# Patient Record
Sex: MALE | Race: BLACK OR AFRICAN AMERICAN | NOT HISPANIC OR LATINO
[De-identification: names, ages, dates, MRNs, and addresses within clinical notes are randomized per-mention and may not be internally consistent; named-entity substitution may affect disease eponyms.]

---

## 2017-04-17 ENCOUNTER — OTHER (OUTPATIENT)
Age: 82
End: 2017-04-17

## 2017-04-18 ENCOUNTER — MESSAGE (OUTPATIENT)
Age: 82
End: 2017-04-18

## 2017-05-22 ENCOUNTER — OTHER (OUTPATIENT)
Age: 82
End: 2017-05-22

## 2017-06-19 ENCOUNTER — OTHER (OUTPATIENT)
Age: 82
End: 2017-06-19

## 2017-06-20 ENCOUNTER — RX RENEWAL (OUTPATIENT)
Age: 82
End: 2017-06-20

## 2017-06-23 ENCOUNTER — EMERGENCY (EMERGENCY)
Facility: HOSPITAL | Age: 82
LOS: 1 days | Discharge: HOME | End: 2017-06-23
Attending: EMERGENCY MEDICINE | Admitting: EMERGENCY MEDICINE
Payer: MEDICARE

## 2017-06-23 VITALS
HEART RATE: 100 BPM | SYSTOLIC BLOOD PRESSURE: 154 MMHG | RESPIRATION RATE: 16 BRPM | OXYGEN SATURATION: 99 % | DIASTOLIC BLOOD PRESSURE: 78 MMHG | TEMPERATURE: 98 F

## 2017-06-23 DIAGNOSIS — Z09 ENCOUNTER FOR FOLLOW-UP EXAMINATION AFTER COMPLETED TREATMENT FOR CONDITIONS OTHER THAN MALIGNANT NEOPLASM: Chronic | ICD-10-CM

## 2017-06-23 PROCEDURE — 99284 EMERGENCY DEPT VISIT MOD MDM: CPT | Mod: GC

## 2017-06-23 NOTE — ED PROVIDER NOTE - CARE PLAN
Principal Discharge DX:	Blurry vision, bilateral  Instructions for follow-up, activity and diet:	FOLLOW UP WITH YOUR Ophthalmologist with WEEK. Follow up with your Primary Medical Doctor within 2-3days. If results or reports were given to you, show copies of your reports given to you. Take all of your medications as previously prescribed. If loss of vision, headache, pain of eyes or any new or concerning symptoms return to the ED. Principal Discharge DX:	Blurry vision, bilateral  Instructions for follow-up, activity and diet:	FOLLOW UP WITH YOUR Ophthalmologist with WEEK. Can follow up at [address] [address], [place], [phone] should there be any issues with your own ophthalmologist. Follow up with your Primary Medical Doctor within 2-3days. If results or reports were given to you, show copies of your reports given to you. Take all of your medications as previously prescribed. If loss of vision, headache, pain of eyes or any new or concerning symptoms return to the ED.

## 2017-06-23 NOTE — ED PROVIDER NOTE - ATTENDING CONTRIBUTION TO CARE
I performed a history and physical exam of the patient and discussed their management with the advanced care provider. I reviewed the advanced care provider's note and agree with the documented findings and plan of care. My medical decision making and objective findings are found above.  Cataracts, 20/200 both eyes. will check occular pressure.

## 2017-06-23 NOTE — ED PROVIDER NOTE - PLAN OF CARE
FOLLOW UP WITH YOUR Ophthalmologist with WEEK. Follow up with your Primary Medical Doctor within 2-3days. If results or reports were given to you, show copies of your reports given to you. Take all of your medications as previously prescribed. If loss of vision, headache, pain of eyes or any new or concerning symptoms return to the ED. FOLLOW UP WITH YOUR Ophthalmologist with WEEK. Can follow up at [address] [address], [place], [phone] should there be any issues with your own ophthalmologist. Follow up with your Primary Medical Doctor within 2-3days. If results or reports were given to you, show copies of your reports given to you. Take all of your medications as previously prescribed. If loss of vision, headache, pain of eyes or any new or concerning symptoms return to the ED.

## 2017-06-23 NOTE — CONSULT NOTE ADULT - SUBJECTIVE AND OBJECTIVE BOX
CC/HPI: 82 M NLP OD, bilateral PK, tube OS, p/w blurred vision OS after getting eye exam at outside ophthalmologist last week. Denies pain.     PMH: DM  POH: PK OU, tube OS, glc  meds: reviewed  all: ornex, percocet CC/HPI: 82 M NLP OD, bilateral PK, tube OS, p/w blurred vision OS after getting eye exam at outside ophthalmologist last week. Denies pain.     PMH: DM  POH: PK OU, tube OS, glc  meds: reviewed  all: ornex, percocet    VA cc near NLP OD, 20/70+ OS  P surgical OU  T 23, 18  EOM full OU    LL flat OU  C/S w+Q OU, tube in place, no exposure  K PK OU, no edema, clear OU, pigment on endothelium  AC D+Q OU, no cell  I surgical OU  L IOL OU    DFE:  OD nerve w/ mild pallor, mac flat  OS 0.7 C/D, mac flat, v/p wnl

## 2017-06-23 NOTE — ED PROVIDER NOTE - OBJECTIVE STATEMENT
81 yo male with PMHx of glaucoma, hld, DM presents to the ED complaining of worsening blurry/foggy/ cloudy vision. Pt also reports 83 yo male with PMHx of glaucoma, hld, DM presents to the ED complaining of worsening blurry/foggy/ cloudy vision. Pt also reports that light causes a glare and makes it hard for him to see. Pt was seen by an ophthalmologist and told he had high pressures in his eye and he needed to follow up with a glaucoma specialist, insurance issues made it hard for him to do that so he was seen in a clinic Wednesday, drops were put in his eyes (unsure of the names) and he symptoms have not improved and still are gradually getting worse. Denies eye pain, headache. 81 yo male with PMHx of glaucoma, hld, DM presents to the ED complaining of worsening blurry/foggy/ cloudy vision. Pt also reports that light causes a glare and makes it hard for him to see. Pt was seen by an ophthalmologist and told he had high pressures in his eye and he needed to follow up with a glaucoma specialist, insurance issues made it hard for him to do that so he was seen in a clinic Wednesday, drops were put in his eyes (unsure of the names) and he symptoms have not improved and still are gradually getting worse. Denies trauma, eye pain, headache, numbness tingling, vision loss. Denies cp, sob, abdominal pain, diarrhea, f/c/n/v, urinary sx.

## 2017-06-23 NOTE — ED ADULT TRIAGE NOTE - CHIEF COMPLAINT QUOTE
pt states that he has been having declining visual acuity since 6/12.  Pt was seen by opthalmology advised that he had "high pressure" in OS, blind in OD, pt states that he can only see outlines

## 2017-06-23 NOTE — ED PROVIDER NOTE - PROGRESS NOTE DETAILS
STEPHANIE Rico: Optho saw patient no acute findings on exam. Pt ok for dc with follow up with his ophthalmologist

## 2017-06-23 NOTE — ED PROVIDER NOTE - MEDICAL DECISION MAKING DETAILS
Tony: slow onset vision changes, being seen by optho, no trauma. Should measure a presure.  Not likely vein or artery occlusion or viteal hemmorrhage. . would refer to optho.

## 2017-06-26 ENCOUNTER — INPATIENT (INPATIENT)
Facility: HOSPITAL | Age: 82
LOS: 8 days | Discharge: NURSING, REHAB OR LONG-TERM CARE | End: 2017-07-05
Attending: HOSPITALIST | Admitting: HOSPITALIST
Payer: MEDICARE

## 2017-06-26 VITALS
DIASTOLIC BLOOD PRESSURE: 76 MMHG | SYSTOLIC BLOOD PRESSURE: 150 MMHG | RESPIRATION RATE: 18 BRPM | HEART RATE: 108 BPM | TEMPERATURE: 98 F | OXYGEN SATURATION: 100 %

## 2017-06-26 DIAGNOSIS — E78.5 HYPERLIPIDEMIA, UNSPECIFIED: ICD-10-CM

## 2017-06-26 DIAGNOSIS — I10 ESSENTIAL (PRIMARY) HYPERTENSION: ICD-10-CM

## 2017-06-26 DIAGNOSIS — E11.3523 TYPE 2 DIABETES MELLITUS WITH PROLIFERATIVE DIABETIC RETINOPATHY WITH TRACTION RETINAL DETACHMENT INVOLVING THE MACULA, BILATERAL: ICD-10-CM

## 2017-06-26 DIAGNOSIS — Z29.9 ENCOUNTER FOR PROPHYLACTIC MEASURES, UNSPECIFIED: ICD-10-CM

## 2017-06-26 DIAGNOSIS — Z09 ENCOUNTER FOR FOLLOW-UP EXAMINATION AFTER COMPLETED TREATMENT FOR CONDITIONS OTHER THAN MALIGNANT NEOPLASM: Chronic | ICD-10-CM

## 2017-06-26 DIAGNOSIS — H53.8 OTHER VISUAL DISTURBANCES: ICD-10-CM

## 2017-06-26 DIAGNOSIS — H40.9 UNSPECIFIED GLAUCOMA: ICD-10-CM

## 2017-06-26 DIAGNOSIS — N40.0 BENIGN PROSTATIC HYPERPLASIA WITHOUT LOWER URINARY TRACT SYMPTOMS: ICD-10-CM

## 2017-06-26 LAB
ALBUMIN SERPL ELPH-MCNC: 4.2 G/DL — SIGNIFICANT CHANGE UP (ref 3.3–5)
ALP SERPL-CCNC: 52 U/L — SIGNIFICANT CHANGE UP (ref 40–120)
ALT FLD-CCNC: 15 U/L — SIGNIFICANT CHANGE UP (ref 4–41)
AST SERPL-CCNC: 18 U/L — SIGNIFICANT CHANGE UP (ref 4–40)
BASOPHILS # BLD AUTO: 0.01 K/UL — SIGNIFICANT CHANGE UP (ref 0–0.2)
BASOPHILS NFR BLD AUTO: 0.2 % — SIGNIFICANT CHANGE UP (ref 0–2)
BILIRUB SERPL-MCNC: 0.2 MG/DL — SIGNIFICANT CHANGE UP (ref 0.2–1.2)
BUN SERPL-MCNC: 15 MG/DL — SIGNIFICANT CHANGE UP (ref 7–23)
CALCIUM SERPL-MCNC: 9.6 MG/DL — SIGNIFICANT CHANGE UP (ref 8.4–10.5)
CHLORIDE SERPL-SCNC: 103 MMOL/L — SIGNIFICANT CHANGE UP (ref 98–107)
CO2 SERPL-SCNC: 23 MMOL/L — SIGNIFICANT CHANGE UP (ref 22–31)
CREAT SERPL-MCNC: 1.06 MG/DL — SIGNIFICANT CHANGE UP (ref 0.5–1.3)
EOSINOPHIL # BLD AUTO: 0.05 K/UL — SIGNIFICANT CHANGE UP (ref 0–0.5)
EOSINOPHIL NFR BLD AUTO: 0.8 % — SIGNIFICANT CHANGE UP (ref 0–6)
GLUCOSE SERPL-MCNC: 149 MG/DL — HIGH (ref 70–99)
HCT VFR BLD CALC: 41.7 % — SIGNIFICANT CHANGE UP (ref 39–50)
HGB BLD-MCNC: 13.9 G/DL — SIGNIFICANT CHANGE UP (ref 13–17)
IMM GRANULOCYTES NFR BLD AUTO: 0.2 % — SIGNIFICANT CHANGE UP (ref 0–1.5)
INR BLD: 1.03 — SIGNIFICANT CHANGE UP (ref 0.88–1.17)
LYMPHOCYTES # BLD AUTO: 1.18 K/UL — SIGNIFICANT CHANGE UP (ref 1–3.3)
LYMPHOCYTES # BLD AUTO: 18.9 % — SIGNIFICANT CHANGE UP (ref 13–44)
MCHC RBC-ENTMCNC: 32.8 PG — SIGNIFICANT CHANGE UP (ref 27–34)
MCHC RBC-ENTMCNC: 33.3 % — SIGNIFICANT CHANGE UP (ref 32–36)
MCV RBC AUTO: 98.3 FL — SIGNIFICANT CHANGE UP (ref 80–100)
MONOCYTES # BLD AUTO: 0.6 K/UL — SIGNIFICANT CHANGE UP (ref 0–0.9)
MONOCYTES NFR BLD AUTO: 9.6 % — SIGNIFICANT CHANGE UP (ref 2–14)
NEUTROPHILS # BLD AUTO: 4.38 K/UL — SIGNIFICANT CHANGE UP (ref 1.8–7.4)
NEUTROPHILS NFR BLD AUTO: 70.3 % — SIGNIFICANT CHANGE UP (ref 43–77)
PLATELET # BLD AUTO: 277 K/UL — SIGNIFICANT CHANGE UP (ref 150–400)
PMV BLD: 9.9 FL — SIGNIFICANT CHANGE UP (ref 7–13)
POTASSIUM SERPL-MCNC: 4.1 MMOL/L — SIGNIFICANT CHANGE UP (ref 3.5–5.3)
POTASSIUM SERPL-SCNC: 4.1 MMOL/L — SIGNIFICANT CHANGE UP (ref 3.5–5.3)
PROT SERPL-MCNC: 8.2 G/DL — SIGNIFICANT CHANGE UP (ref 6–8.3)
PROTHROM AB SERPL-ACNC: 11.6 SEC — SIGNIFICANT CHANGE UP (ref 9.8–13.1)
RBC # BLD: 4.24 M/UL — SIGNIFICANT CHANGE UP (ref 4.2–5.8)
RBC # FLD: 12.4 % — SIGNIFICANT CHANGE UP (ref 10.3–14.5)
SODIUM SERPL-SCNC: 142 MMOL/L — SIGNIFICANT CHANGE UP (ref 135–145)
WBC # BLD: 6.23 K/UL — SIGNIFICANT CHANGE UP (ref 3.8–10.5)
WBC # FLD AUTO: 6.23 K/UL — SIGNIFICANT CHANGE UP (ref 3.8–10.5)

## 2017-06-26 PROCEDURE — 99223 1ST HOSP IP/OBS HIGH 75: CPT

## 2017-06-26 RX ORDER — DEXTROSE 50 % IN WATER 50 %
1 SYRINGE (ML) INTRAVENOUS ONCE
Qty: 0 | Refills: 0 | Status: DISCONTINUED | OUTPATIENT
Start: 2017-06-26 | End: 2017-06-29

## 2017-06-26 RX ORDER — DOXAZOSIN MESYLATE 4 MG
2 TABLET ORAL AT BEDTIME
Qty: 0 | Refills: 0 | Status: DISCONTINUED | OUTPATIENT
Start: 2017-06-26 | End: 2017-07-05

## 2017-06-26 RX ORDER — SODIUM CHLORIDE 9 MG/ML
1000 INJECTION, SOLUTION INTRAVENOUS
Qty: 0 | Refills: 0 | Status: DISCONTINUED | OUTPATIENT
Start: 2017-06-26 | End: 2017-06-28

## 2017-06-26 RX ORDER — LISINOPRIL 2.5 MG/1
10 TABLET ORAL DAILY
Qty: 0 | Refills: 0 | Status: DISCONTINUED | OUTPATIENT
Start: 2017-06-27 | End: 2017-06-28

## 2017-06-26 RX ORDER — INSULIN LISPRO 100/ML
VIAL (ML) SUBCUTANEOUS AT BEDTIME
Qty: 0 | Refills: 0 | Status: DISCONTINUED | OUTPATIENT
Start: 2017-06-26 | End: 2017-07-05

## 2017-06-26 RX ORDER — SIMVASTATIN 20 MG/1
1 TABLET, FILM COATED ORAL
Qty: 0 | Refills: 0 | COMMUNITY

## 2017-06-26 RX ORDER — DEXTROSE 50 % IN WATER 50 %
12.5 SYRINGE (ML) INTRAVENOUS ONCE
Qty: 0 | Refills: 0 | Status: DISCONTINUED | OUTPATIENT
Start: 2017-06-26 | End: 2017-06-29

## 2017-06-26 RX ORDER — GLUCAGON INJECTION, SOLUTION 0.5 MG/.1ML
1 INJECTION, SOLUTION SUBCUTANEOUS ONCE
Qty: 0 | Refills: 0 | Status: DISCONTINUED | OUTPATIENT
Start: 2017-06-26 | End: 2017-06-29

## 2017-06-26 RX ORDER — SIMVASTATIN 20 MG/1
20 TABLET, FILM COATED ORAL AT BEDTIME
Qty: 0 | Refills: 0 | Status: DISCONTINUED | OUTPATIENT
Start: 2017-06-26 | End: 2017-07-05

## 2017-06-26 RX ORDER — DEXTROSE 50 % IN WATER 50 %
25 SYRINGE (ML) INTRAVENOUS ONCE
Qty: 0 | Refills: 0 | Status: DISCONTINUED | OUTPATIENT
Start: 2017-06-26 | End: 2017-07-05

## 2017-06-26 RX ORDER — DEXTROSE 50 % IN WATER 50 %
25 SYRINGE (ML) INTRAVENOUS ONCE
Qty: 0 | Refills: 0 | Status: DISCONTINUED | OUTPATIENT
Start: 2017-06-26 | End: 2017-06-29

## 2017-06-26 RX ORDER — INSULIN LISPRO 100/ML
VIAL (ML) SUBCUTANEOUS
Qty: 0 | Refills: 0 | Status: DISCONTINUED | OUTPATIENT
Start: 2017-06-26 | End: 2017-07-05

## 2017-06-26 RX ORDER — ENOXAPARIN SODIUM 100 MG/ML
40 INJECTION SUBCUTANEOUS EVERY 24 HOURS
Qty: 0 | Refills: 0 | Status: DISCONTINUED | OUTPATIENT
Start: 2017-06-26 | End: 2017-06-27

## 2017-06-26 RX ADMIN — SIMVASTATIN 20 MILLIGRAM(S): 20 TABLET, FILM COATED ORAL at 22:35

## 2017-06-26 RX ADMIN — ENOXAPARIN SODIUM 40 MILLIGRAM(S): 100 INJECTION SUBCUTANEOUS at 22:36

## 2017-06-26 RX ADMIN — Medication 2 MILLIGRAM(S): at 23:14

## 2017-06-26 NOTE — ED ADULT NURSE NOTE - OBJECTIVE STATEMENT
Received in intake spot 13. Alert and oriented x3, ambulatory. Co blurry vision x 2 weeks, progressively getting worse. Hx of glaucoma. Told by opthomologist to go to ED for eval. Labs sent. IV placed.

## 2017-06-26 NOTE — H&P ADULT - PROBLEM SELECTOR PROBLEM 2
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

## 2017-06-26 NOTE — ED PROVIDER NOTE - PROGRESS NOTE DETAILS
Derick :  Case discussed w opthalmology attending Dr. Mccarthy and pt will be seen while he is in the hospital.  We are admitting him to the hospital because he cannot see.

## 2017-06-26 NOTE — PATIENT PROFILE ADULT. - ABILITY TO HEAR (WITH HEARING AID OR HEARING APPLIANCE IF NORMALLY USED):
Mildly to Moderately Impaired: difficulty hearing in some environments or speaker may need to increase volume or speak distinctly/right ear [place]

## 2017-06-26 NOTE — H&P ADULT - HISTORY OF PRESENT ILLNESS
82y M with PMHX of Glaucoma, DM2, BPH p/w worsening blurry vision. Patient endorses progressive worsening of his vision the last few months. Patient normally follows up with Opthalmology at [place] on [address]. Patient was last seen by opthalmology on June 12th where he was told his IOP was 18 and he was referred to a glaucoma specialist. However, due to his worsening vision he felt unsafe traveling. Patient presented to [facility] ED on june 23rd with similar complaint. Was seen by Opthalmology at that time and recommend f/u with outpt Opthomalogist or f/u at [address] [place] [address] [phone] if he chooses too. Patient decided to return because his vision did not improve and he felt unsafe traveling. Patient currently denies CP, SOB, fever, chills, nausea, vomiting, denies weakness, dizziness, or eye pain. States that he used to be able to read his pill bottles but now everything is blurry.    VS in the ED: T: 36.7, HR: 108, BP: 150/76, RR: 18, 100% on RA    Labs personally reviewed: CBC WNL, CMP WNL.  No EKG was performed 82y M with PMHX of Glaucoma, DM2, BPH, HLD p/w worsening blurry vision. Patient endorses progressive worsening of his vision the last few months. Patient normally follows up with Opthalmology at [place] on [address]. Patient was last seen by opthalmology on June 12th where he was told his IOP was 18 and he was referred to a glaucoma specialist. However, due to his worsening vision he felt unsafe traveling. Patient presented to [facility] ED on june 23rd with similar complaint. Was seen by Opthalmology at that time and recommend f/u with outpt Opthomalogist or f/u at [address] [place] [address] [phone] if he chooses too. Patient decided to return because his vision did not improve and he felt unsafe traveling. Patient currently denies CP, SOB, fever, chills, nausea, vomiting, denies weakness, dizziness, or eye pain. States that he used to be able to read his pill bottles but now everything is blurry.    VS in the ED: T: 36.7, HR: 108, BP: 150/76, RR: 18, 100% on RA    Labs personally reviewed: CBC WNL, CMP WNL.  No EKG was performed

## 2017-06-26 NOTE — ED ADULT NURSE NOTE - CHIEF COMPLAINT QUOTE
Pt c/o blurry vision x2 weeks--getting progressively worse. Pt was seen in ER on friday and eyes are very bad--pt was called today to come back to ER right away--ophthomologist called pt to come back for futher evaluation

## 2017-06-26 NOTE — H&P ADULT - PROBLEM SELECTOR PLAN 2
Patient on oral hypoglycemics at home. Previous A1c was from 2016 and was 9.8  - Check A1c in AM  - Start ISS, FSG qHS & qAC

## 2017-06-26 NOTE — ED PROVIDER NOTE - OBJECTIVE STATEMENT
81 yo male with PMHx of glaucoma, hld, DM presents to the ED complaining of worsening blurry/foggy/ cloudy vision..  Pt was seen in the ED Friday 6/23/17 for evaluation of glucoma, pt has elevated introcular pressures which were going down.  Pt lives alone and was called back to the ED today because he says the vision in both eyes is now worse. 81 yo male with PMHx of glaucoma, hld, DM presents to the ED complaining of worsening blurry/foggy/ cloudy vision..  Pt was seen in the ED Friday 6/23/17 for evaluation of glucoma, pt has elevated introcular pressures which were going down.  Pt lives alone and was called back to the ED today because he says the vision in both eyes is now worse.  Pt says that his vision is now worse than ever.  He was able to see clearly two weeks ago.

## 2017-06-26 NOTE — H&P ADULT - PROBLEM SELECTOR PLAN 1
-Patient's sx of visual blurriness appear 2/2 to progressive worsening of his Glaucoma.  - Patient is being followed by opthomalogy who last time recommended for him to f/u with his outpatient opthomalgoist however due to the patients worsening vision it would be unsafe to discharge patient as he lives alone.  - F/u Opthalmology recs but will likely start latanoprost -Patient's sx of visual blurriness appear 2/2 to progressive worsening of his Glaucoma.  - Patient is being followed by opthomalogy who last time recommended for him to f/u with his outpatient ophthalmologist however due to the patients worsening vision it would be unsafe to discharge patient as he lives alone.  - F/u Opthalmology recs but will likely start latanoprost  - F/u  -Patient's sx of visual blurriness appear 2/2 to progressive worsening of his Glaucoma.  - Patient is being followed by opthalmology who last time recommended for him to f/u with his outpatient ophthalmologist however due to the patients worsening vision it would be unsafe to discharge patient as he lives alone.  - F/u Opthalmology recs but will likely start latanoprost  - F/u  -Patient's sx of visual blurriness appear 2/2 to progressive worsening of his Glaucoma.  - Patient is being followed by opthalmology who last time recommended for him to f/u with his outpatient ophthalmologist however due to the patients worsening vision it would be unsafe to discharge patient as he lives alone.  - F/u Opthalmology recs but will likely start latanoprost; OBTAIN SPECIFIC RECCS TO INITIATE GLAUCOMA TREATMENT FROM OPTHALMOLOGY WHILE INPATIENT  - F/u

## 2017-06-26 NOTE — H&P ADULT - NSHPPHYSICALEXAM_GEN_ALL_CORE
General: WN/WD NAD  HEENT:  Markedly diminished visual acuity B/L. Left eye worse than Right eye. PERRLA, EOMI, moist mucous membranes  Neurology: A&Ox3, nonfocal, SALCIDO x 4  Respiratory: CTA B/L, normal respiratory effort, no wheezes, crackles, rales  CV: RRR, S1S2, no murmurs, rubs or gallops  Abdominal: Soft, NT, ND +BS, Last BM  Extremities: No edema, + peripheral pulses General: WN/WD NAD  HEENT:  Markedly diminished visual acuity B/L. Left eye worse than Right eye. PERRLA, EOMI, moist mucous membranes  Neurology: A&Ox3, nonfocal, SALCIDO x 4  Respiratory: CTA B/L, normal respiratory effort, no wheezes, crackles, rales  CV: RRR, S1S2, no murmurs, rubs or gallops  Abdominal: Soft, NT, ND +BS, Last BM  Extremities: No edema, + peripheral pulses  Skin: no petechia, rashes seen

## 2017-06-26 NOTE — ED ADULT TRIAGE NOTE - CHIEF COMPLAINT QUOTE
Pt c/o blurry vision x2 weeks--getting progressively worse. Pt was seen in ER on friday and eyes are very bad--pt was called today to come back to ER right away Pt c/o blurry vision x2 weeks--getting progressively worse. Pt was seen in ER on friday and eyes are very bad--pt was called today to come back to ER right away--ophthomologist called pt to come back for futher evaluation

## 2017-06-26 NOTE — H&P ADULT - NSHPLABSRESULTS_GEN_ALL_CORE
Labs personally reviewed: CBC WNL, CMP WNL.  No EKG was performed Labs personally reviewed: CBC WNL, CMP WNL.  No EKG was performed. Will check one

## 2017-06-27 LAB — HBA1C BLD-MCNC: 9.2 % — HIGH (ref 4–5.6)

## 2017-06-27 PROCEDURE — 12345: CPT | Mod: GC,NC

## 2017-06-27 PROCEDURE — 99222 1ST HOSP IP/OBS MODERATE 55: CPT

## 2017-06-27 RX ORDER — TIMOLOL 0.5 %
1 DROPS OPHTHALMIC (EYE)
Qty: 0 | Refills: 0 | Status: DISCONTINUED | OUTPATIENT
Start: 2017-06-27 | End: 2017-07-05

## 2017-06-27 RX ORDER — BRIMONIDINE TARTRATE 2 MG/MG
1 SOLUTION/ DROPS OPHTHALMIC
Qty: 0 | Refills: 0 | Status: DISCONTINUED | OUTPATIENT
Start: 2017-06-27 | End: 2017-07-05

## 2017-06-27 RX ORDER — LATANOPROST 0.05 MG/ML
1 SOLUTION/ DROPS OPHTHALMIC; TOPICAL AT BEDTIME
Qty: 0 | Refills: 0 | Status: DISCONTINUED | OUTPATIENT
Start: 2017-06-27 | End: 2017-07-05

## 2017-06-27 RX ADMIN — Medication 2 MILLIGRAM(S): at 22:16

## 2017-06-27 RX ADMIN — Medication 3: at 12:42

## 2017-06-27 RX ADMIN — SIMVASTATIN 20 MILLIGRAM(S): 20 TABLET, FILM COATED ORAL at 22:16

## 2017-06-27 RX ADMIN — LISINOPRIL 10 MILLIGRAM(S): 2.5 TABLET ORAL at 06:35

## 2017-06-27 RX ADMIN — LATANOPROST 1 DROP(S): 0.05 SOLUTION/ DROPS OPHTHALMIC; TOPICAL at 22:16

## 2017-06-27 RX ADMIN — BRIMONIDINE TARTRATE 1 DROP(S): 2 SOLUTION/ DROPS OPHTHALMIC at 17:58

## 2017-06-27 RX ADMIN — Medication 2: at 17:57

## 2017-06-27 RX ADMIN — Medication 1 DROP(S): at 17:59

## 2017-06-27 NOTE — PROGRESS NOTE ADULT - PROBLEM SELECTOR PLAN 2
Patient on oral hypoglycemics at home. Previous A1c was from 2016 and was 9.8  -Continue Metformin and Jenuvia

## 2017-06-27 NOTE — CONSULT NOTE ADULT - SUBJECTIVE AND OBJECTIVE BOX
CC/HPI: 82 M hx of adv glc, NLP OD p/w decreased vision for two weeks. Pt has not been on any drops for over a year. Did not see an ophthalmologist for a year until going to [facility] in early June. He was advised to f/u w the glaucoma specialist, but was not able to go due to insurance issues. Pt states ever since his visit to the ophthalmologist in early June his vision has decreased.     Spoke with [facility] resident. Pt was 20/200 PH 20/80 OS at last visit in early June. 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 IOP was 14 OS. Pt was not on any drops.     PMH: DM, BPH, HLD  POH: adv glc, NLP OD, bilateral PK, tube OS  meds: reviewed, not on any glc drops  all: reviewed    VA cc near  NLP OD, 20/200 OS PH 20/70-  P surgical OU  T 23, 23  EOM full OU  CVF unable    LL flat OU  C/S w+Q OU, tube in place, no exposure  K PK OU, no edema, clear OU, pigment on endothelium  AC D+F OU, no cell  I surgical OU  L IOL OU CC/HPI: 82 M hx of adv glc, NLP OD p/w decreased vision for two weeks. Pt has not been on any drops for over a year. Did not see an ophthalmologist for a year until going to [facility] in early June. He was advised to f/u w the glaucoma specialist, but was not able to go due to insurance issues. Pt states ever since his visit to the ophthalmologist in early June his vision has decreased.     Spoke with [facility] resident. Pt was 20/200 PH 20/80 OS at last visit in early June. 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 IOP was 14 OS. Pt was not on any drops.     PMH: DM, BPH, HLD  POH: adv glc, NLP OD, bilateral PK, tube OS  meds: reviewed, not on any glc drops  all: reviewed    VA cc near  NLP OD, 20/200 OS PH 20/70-  P surgical OU  T 23, 23  EOM full OU  CVF unable    LL flat OU  C/S w+Q OU, tube in place, no exposure  K PK OU, no edema, pigment on endothelium OS  AC D+F OU, no cell  I surgical OU  L IOL OU    DFE:  OD glaucomatous nerve, m/v/p wnl  OS glaucomatous nerve, m/v/p wnl CC/HPI: 82 M hx of adv glc, NLP OD p/w decreased vision for two weeks OS. Pt has not been on any drops for over a year. Did not see an ophthalmologist for a year until going to [facility] in early June. He was advised to f/u w the glaucoma specialist, but was not able to go due to insurance issues. Pt states ever since his visit to the ophthalmologist in early June his vision has decreased.     Spoke with [facility] resident. Pt was 20/200 PH 20/80 OS at last visit in early June. 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 IOP was 14 OS. Pt was not on any drops.     PMH: DM, BPH, HLD  POH: adv glc, NLP OD, bilateral PK, tube OS  meds: reviewed, not on any glc drops  all: reviewed    VA cc near  NLP OD, 20/200 OS PH 20/70-  P surgical OU  T 23, 23  EOM full OU  CVF limited cooperation    SLE  LLA flat OU  C/S w+Q OU, tube in place OU, no exposure  K PK intact, mild haziness OU, minimal chronic appearing edema OU, old appearing pigment on endothelium OS  AC D+F OU, no cell, QUIET OU  I surgical OU  L IOL OU, PCO     DFE:  OD >.9, pale nerve, m/v/p wnl  OS hazy view, ~.7 s/p , m/v/p wnl. mac flat CC/HPI: 82 M hx of adv glc, NLP OD p/w decreased vision for two weeks OS. Pt has not been on any drops for over a year. Did not see an ophthalmologist for a year until going to [facility] in early June. He was advised to f/u w the glaucoma specialist, but was not able to go due to insurance issues. Pt states ever since his visit to the ophthalmologist in early June his vision has decreased.     Spoke with [facility] resident. Pt was 20/200 PH 20/80 OS at last visit in early June. 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 IOP was 14 OS. Pt was not on any drops.     PMH: DM, BPH, HLD  POH: adv glc, NLP OD, bilateral PK, tube OS  meds: reviewed, not on any glc drops  all: reviewed    Social: neg IVDA  ROS: neg x 10. denies change in strength/sensation/jaw claudication/HA/scalp tenderness  Fhx: neg blindness    VA cc near  NLP OD, 20/200 OS PH 20/70-  P surgical OU  T 23, 23  EOM full OU  CVF limited cooperation    SLE  LLA flat OU  C/S w+Q OU, tube in place OU, no exposure  K PK intact, mild haziness OU, minimal chronic appearing edema OU, old appearing pigment on endothelium OS  AC D+F OU, no cell, QUIET OU  I surgical OU  L IOL OU, PCO     DFE: hazy view OU  OD >.9, pale nerve, m/v/p wnl  OS hazy view, ~.7 s/p , m/v/p wnl. mac flat   no heme/rd seen OU

## 2017-06-27 NOTE — CONSULT NOTE ADULT - ASSESSMENT
82 M extensive ocular history w/ blurred vision. No acute pathology on exam. Pt endorsed improvement in vision midway through exam. Stated he was able to read his pill bottles as he was able to in the past.   - reassurance provided  - recommend f/u w/ own ophthalmologist next week  - can f/u at [address] [place] [address], [place], [phone] should there be any issues with his own ophthalmologist.
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 Pt endorses decreased vision, but visual acuity is similar to last vision recorded at [facility] in early June.   - start latanoprost qHS OU, combigan BID OU  - further recs to follow

## 2017-06-27 NOTE — PROGRESS NOTE ADULT - PROBLEM SELECTOR PLAN 1
-Patient's sx of visual blurriness appear 2/2 to progressive worsening of his Glaucoma.  - Patient is being followed by opthalmology who last time recommended for him to f/u with his outpatient ophthalmologist however due to the patients worsening vision it would be unsafe to discharge patient as he lives alone.  - Will follow up with Optho on specific recommendations.   - F/u

## 2017-06-27 NOTE — CONSULT NOTE ADULT - ASSESSMENT
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 Pt endorses decreased vision, but visual acuity is similar to last vision recorded at [facility] in early June.   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 Pt endorses decreased vision, but visual acuity is similar to last vision recorded at [facility] in early June.   - start latanoprost qHS OU, combigan BID OU  - f/u w/ [facility] on discharge or at [address] [facility]. [facility] [address], [place], [phone]. 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 Pt endorses decreased vision, but visual acuity is similar to last vision recorded at [facility] in early June.   - start latanoprost qHS OS, combigan BID OS  - f/u w/ [facility] on discharge or at [address] [facility]. [facility] [address], [place], [phone].

## 2017-06-27 NOTE — CONSULT NOTE ADULT - ATTENDING COMMENTS
pt seen and examined with resident. agree with above  subacute visual loss OS in monocular patient. Recently seen for similar complaints at primary eye MD at [facility]. vision stable since then.  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  - preservative free artifical tears QID OU  needs outpatient f/up with [facility] within 1 week of discharge or can see our eye clinic at [address] [place] [address], [phone].  cotn current care per primary team pt seen and examined with resident. agree with above  subacute visual loss OS in monocular patient. Recently seen for similar complaints at primary eye MD at [facility]. vision stable since then.  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  - preservative free artifical tears QID OU  needs outpatient f/up for further evaluation with [facility] within 1 week of discharge or can see our eye clinic at [address] [place] [address], [phone].  cont current care per primary team, neuro-imaging if concerned for CVA

## 2017-06-27 NOTE — CONSULT NOTE ADULT - SUBJECTIVE AND OBJECTIVE BOX
CC/HPI: 82 M hx of adv glc, NLP OD p/w decreased vision for two weeks. Pt has not been on any drops for over a year. Did not see an ophthalmologist for a year until going to [facility] in early June. He was advised to f/u w the glaucoma specialist, but was not able to go due to insurance issues. Pt states ever since his visit to the ophthalmologist in early June his vision has decreased.     PMH: CC/HPI: 82 M hx of adv glc, NLP OD p/w decreased vision for two weeks. Pt has not been on any drops for over a year. Did not see an ophthalmologist for a year until going to [facility] in early June. He was advised to f/u w the glaucoma specialist, but was not able to go due to insurance issues. Pt states ever since his visit to the ophthalmologist in early June his vision has decreased.     Spoke with [facility] resident. Pt was 20/200 PH 20/80 OS at last visit in early June. 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 IOP was 14 OS. Pt was not on any drops.     PMH: DM, BPH, HLD  POH: adv glc, NLP OD, bilateral PK, tube OS  meds: reviewed, not on any glc drops  all: reviewed    VA NLP OD, CF OS  P surgical OU  T 23, 23  EOM full OU  CVF unable    LL flat OU  C/S w+Q OU, tube in place, no exposure  K PK OU, no edema, clear OU, pigment on endothelium  AC D+F OU, no cell  I surgical OU  L IOL OU CC/HPI: 82 M hx of adv glc, NLP OD p/w decreased vision for two weeks. Pt has not been on any drops for over a year. Did not see an ophthalmologist for a year until going to [facility] in early June. He was advised to f/u w the glaucoma specialist, but was not able to go due to insurance issues. Pt states ever since his visit to the ophthalmologist in early June his vision has decreased.     Spoke with [facility] resident. Pt was 20/200 PH 20/80 OS at last visit in early June. 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 IOP was 14 OS. Pt was not on any drops.     PMH: DM, BPH, HLD  POH: adv glc, NLP OD, bilateral PK, tube OS  meds: reviewed, not on any glc drops  all: reviewed    VA cc near  NLP OD, 20/200 OS PH 20/70-  P surgical OU  T 23, 23  EOM full OU  CVF unable    LL flat OU  C/S w+Q OU, tube in place, no exposure  K PK OU, no edema, clear OU, pigment on endothelium  AC D+F OU, no cell  I surgical OU  L IOL OU

## 2017-06-27 NOTE — PROGRESS NOTE ADULT - SUBJECTIVE AND OBJECTIVE BOX
Patient is a 82y old  Male who presents with a chief complaint of Blurry vision (26 Jun 2017 16:54)        SUBJECTIVE / OVERNIGHT EVENTS: Patient endorses continued blurry vision. Left eye feels "sore". Denies headahcem nausea, vomiting or any other issues.       MEDICATIONS  (STANDING):  insulin lispro (HumaLOG) corrective regimen sliding scale   SubCutaneous three times a day before meals  insulin lispro (HumaLOG) corrective regimen sliding scale   SubCutaneous at bedtime  dextrose 5%. 1000 milliLiter(s) (50 mL/Hr) IV Continuous <Continuous>  dextrose 50% Injectable 12.5 Gram(s) IV Push once  dextrose 50% Injectable 25 Gram(s) IV Push once  dextrose 50% Injectable 25 Gram(s) IV Push once  simvastatin 20 milliGRAM(s) Oral at bedtime  doxazosin 2 milliGRAM(s) Oral at bedtime  lisinopril 10 milliGRAM(s) Oral daily    MEDICATIONS  (PRN):  dextrose Gel 1 Dose(s) Oral once PRN Blood Glucose LESS THAN 70 milliGRAM(s)/deciLiter  glucagon  Injectable 1 milliGRAM(s) IntraMuscular once PRN Glucose <70 milliGRAM(s)/deciLiter      Vital Signs Last 24 Hrs  T(C): 36.7 (06-27-17 @ 06:31), Max: 36.7 (06-26-17 @ 13:16)  HR: 69 (06-27-17 @ 06:31) (67 - 108)  BP: 122/68 (06-27-17 @ 06:31) (122/68 - 151/86)  RR: 17 (06-27-17 @ 06:31) (16 - 18)  SpO2: 100% (06-27-17 @ 06:31) (100% - 100%)  CAPILLARY BLOOD GLUCOSE  133 (27 Jun 2017 08:58)  215 (26 Jun 2017 22:17)        I&O's Summary    Physical Exam:  General: WN/WD NAD HEENT:  Markedly diminished visual acuity B/L. Left eye worse than Right eye. PERRLA, EOMI, moist mucous membranes Neurology: A&Ox3, nonfocal, SALCIDO x 4 Respiratory: CTA B/L, normal respiratory effort, no wheezes, crackles, rales CV: RRR, S1S2, no murmurs, rubs or gallops Abdominal: Soft, NT, ND +BS, Last BM Extremities: No edema, + peripheral pulses Skin: no petechia, rashes seen	      LABS:                        13.9   6.23  )-----------( 277      ( 26 Jun 2017 15:10 )             41.7     06-26    142  |  103  |  15  ----------------------------<  149<H>  4.1   |  23  |  1.06    Ca    9.6      26 Jun 2017 15:10    TPro  8.2  /  Alb  4.2  /  TBili  0.2  /  DBili  x   /  AST  18  /  ALT  15  /  AlkPhos  52  06-26    PT/INR - ( 26 Jun 2017 15:10 )   PT: 11.6 SEC;   INR: 1.03

## 2017-06-28 PROCEDURE — 99232 SBSQ HOSP IP/OBS MODERATE 35: CPT | Mod: GC

## 2017-06-28 RX ADMIN — LATANOPROST 1 DROP(S): 0.05 SOLUTION/ DROPS OPHTHALMIC; TOPICAL at 22:44

## 2017-06-28 RX ADMIN — Medication 1: at 09:42

## 2017-06-28 RX ADMIN — Medication 1 DROP(S): at 17:45

## 2017-06-28 RX ADMIN — SIMVASTATIN 20 MILLIGRAM(S): 20 TABLET, FILM COATED ORAL at 22:44

## 2017-06-28 RX ADMIN — Medication 1 DROP(S): at 05:54

## 2017-06-28 RX ADMIN — BRIMONIDINE TARTRATE 1 DROP(S): 2 SOLUTION/ DROPS OPHTHALMIC at 17:45

## 2017-06-28 RX ADMIN — Medication 2: at 17:46

## 2017-06-28 RX ADMIN — Medication 2: at 13:29

## 2017-06-28 RX ADMIN — Medication 1 DROP(S): at 07:02

## 2017-06-28 RX ADMIN — Medication 1 DROP(S): at 01:37

## 2017-06-28 RX ADMIN — BRIMONIDINE TARTRATE 1 DROP(S): 2 SOLUTION/ DROPS OPHTHALMIC at 05:54

## 2017-06-28 RX ADMIN — Medication 2 MILLIGRAM(S): at 22:44

## 2017-06-28 NOTE — PROGRESS NOTE ADULT - SUBJECTIVE AND OBJECTIVE BOX
Patient is a 82y old  Male who presents with a chief complaint of Blurry vision (26 Jun 2017 16:54)        SUBJECTIVE / OVERNIGHT EVENTS: Patient had no acute events overnight. This morning, patient complains that he still can not see. He feels as though his eye sight is worse than yesterday despite receiving the eye drops.      MEDICATIONS  (STANDING):  insulin lispro (HumaLOG) corrective regimen sliding scale   SubCutaneous three times a day before meals  insulin lispro (HumaLOG) corrective regimen sliding scale   SubCutaneous at bedtime  dextrose 50% Injectable 12.5 Gram(s) IV Push once  dextrose 50% Injectable 25 Gram(s) IV Push once  dextrose 50% Injectable 25 Gram(s) IV Push once  simvastatin 20 milliGRAM(s) Oral at bedtime  doxazosin 2 milliGRAM(s) Oral at bedtime  latanoprost 0.005% Ophthalmic Solution 1 Drop(s) Both EYES at bedtime  brimonidine 0.2% Ophthalmic Solution 1 Drop(s) Both EYES two times a day  timolol 0.5% Solution 1 Drop(s) Both EYES two times a day  artificial tears (preservative free) Ophthalmic Solution 1 Drop(s) Both EYES four times a day    MEDICATIONS  (PRN):  dextrose Gel 1 Dose(s) Oral once PRN Blood Glucose LESS THAN 70 milliGRAM(s)/deciLiter  glucagon  Injectable 1 milliGRAM(s) IntraMuscular once PRN Glucose <70 milliGRAM(s)/deciLiter      Vital Signs Last 24 Hrs  T(C): 36.6 (06-28-17 @ 05:38), Max: 36.8 (06-27-17 @ 15:31)  HR: 56 (06-28-17 @ 10:30) (53 - 86)  BP: 119/56 (06-28-17 @ 10:30) (112/64 - 129/61)  RR: 17 (06-28-17 @ 10:30) (16 - 18)  SpO2: 99% (06-28-17 @ 10:30) (98% - 99%)  CAPILLARY BLOOD GLUCOSE  225 (28 Jun 2017 12:40)  186 (28 Jun 2017 08:31)  197 (27 Jun 2017 22:33)  236 (27 Jun 2017 19:14)  238 (27 Jun 2017 17:31)        I&O's Summary    27 Jun 2017 07:01  -  28 Jun 2017 07:00  --------------------------------------------------------  IN: 0 mL / OUT: 250 mL / NET: -250 mL      Physical Exam:  General: WN/WD NAD HEENT:  Markedly diminished visual acuity B/L. Left eye worse than Right eye. PERRLA, EOMI, moist mucous membranes Neurology: A&Ox3, nonfocal, SALCIDO x 4 Respiratory: CTA B/L, normal respiratory effort, no wheezes, crackles, rales CV: RRR, S1S2, no murmurs, rubs or gallops Abdominal: Soft, NT, ND +BS, Last BM Extremities: No edema, + peripheral pulses Skin: no petechia, rashes seen	      LABS:                        13.9   6.23  )-----------( 277      ( 26 Jun 2017 15:10 )             41.7     06-26    142  |  103  |  15  ----------------------------<  149<H>  4.1   |  23  |  1.06    Ca    9.6      26 Jun 2017 15:10    TPro  8.2  /  Alb  4.2  /  TBili  0.2  /  DBili  x   /  AST  18  /  ALT  15  /  AlkPhos  52  06-26    PT/INR - ( 26 Jun 2017 15:10 )   PT: 11.6 SEC;   INR: 1.03            Consultant(s) Notes Reviewed:  OPTHO  Care Discussed with Consultants/Other Providers:OPTJO

## 2017-06-28 NOTE — PROGRESS NOTE ADULT - PROBLEM SELECTOR PLAN 6
none, as he is ambulatory none, as he is ambulatory    Dispo:  -PT consult ordered, will follow recommendations   - F/u  regarding home services

## 2017-06-28 NOTE — PROGRESS NOTE ADULT - PROBLEM SELECTOR PLAN 1
-Patient's sx of visual blurriness appear 2/2 to progressive worsening of his Glaucoma.  - Patient is being followed by opthalmology who last time recommended for him to f/u with his outpatient ophthalmologist however due to the patients worsening vision it would be unsafe to discharge patient as he lives alone.  - Will follow up with Optho on specific recommendations.   - F/u  -Patient's sx of visual blurriness appear 2/2 to progressive worsening of his Glaucoma.  -Will follow Optho recommendations: Latanoprost qHS, Combigan BID and artifical tears

## 2017-06-28 NOTE — PROGRESS NOTE ADULT - PROBLEM SELECTOR PLAN 3
BP stable, C/w Lisinopril 10mg BP stable, will hold Lisinopril 10mg as patient is complaining of low blood pressure

## 2017-06-29 PROCEDURE — 99232 SBSQ HOSP IP/OBS MODERATE 35: CPT | Mod: GC

## 2017-06-29 RX ORDER — INSULIN GLARGINE 100 [IU]/ML
5 INJECTION, SOLUTION SUBCUTANEOUS AT BEDTIME
Qty: 0 | Refills: 0 | Status: DISCONTINUED | OUTPATIENT
Start: 2017-06-29 | End: 2017-06-30

## 2017-06-29 RX ORDER — ENOXAPARIN SODIUM 100 MG/ML
40 INJECTION SUBCUTANEOUS EVERY 24 HOURS
Qty: 0 | Refills: 0 | Status: DISCONTINUED | OUTPATIENT
Start: 2017-06-29 | End: 2017-07-05

## 2017-06-29 RX ORDER — CALCIUM CARBONATE 500(1250)
1 TABLET ORAL ONCE
Qty: 0 | Refills: 0 | Status: COMPLETED | OUTPATIENT
Start: 2017-06-29 | End: 2017-06-29

## 2017-06-29 RX ADMIN — ENOXAPARIN SODIUM 40 MILLIGRAM(S): 100 INJECTION SUBCUTANEOUS at 12:22

## 2017-06-29 RX ADMIN — Medication 1: at 00:03

## 2017-06-29 RX ADMIN — BRIMONIDINE TARTRATE 1 DROP(S): 2 SOLUTION/ DROPS OPHTHALMIC at 06:37

## 2017-06-29 RX ADMIN — SIMVASTATIN 20 MILLIGRAM(S): 20 TABLET, FILM COATED ORAL at 22:31

## 2017-06-29 RX ADMIN — Medication 1 DROP(S): at 23:55

## 2017-06-29 RX ADMIN — Medication 1: at 22:30

## 2017-06-29 RX ADMIN — Medication 1 DROP(S): at 17:09

## 2017-06-29 RX ADMIN — Medication 1 DROP(S): at 12:22

## 2017-06-29 RX ADMIN — Medication 2 MILLIGRAM(S): at 22:31

## 2017-06-29 RX ADMIN — INSULIN GLARGINE 5 UNIT(S): 100 INJECTION, SOLUTION SUBCUTANEOUS at 22:31

## 2017-06-29 RX ADMIN — LATANOPROST 1 DROP(S): 0.05 SOLUTION/ DROPS OPHTHALMIC; TOPICAL at 22:31

## 2017-06-29 RX ADMIN — Medication 2: at 18:00

## 2017-06-29 RX ADMIN — Medication 2: at 12:27

## 2017-06-29 RX ADMIN — Medication 1 DROP(S): at 06:37

## 2017-06-29 RX ADMIN — Medication 1 TABLET(S): at 23:53

## 2017-06-29 RX ADMIN — BRIMONIDINE TARTRATE 1 DROP(S): 2 SOLUTION/ DROPS OPHTHALMIC at 17:09

## 2017-06-29 RX ADMIN — Medication 1: at 09:34

## 2017-06-29 NOTE — PROGRESS NOTE ADULT - PROBLEM SELECTOR PLAN 1
-Patient's sx of visual blurriness appear 2/2 to progressive worsening of his Glaucoma.  -Will follow Optho recommendations: Latanoprost qHS, Combigan BID and artifical tears

## 2017-06-29 NOTE — PROGRESS NOTE ADULT - PROBLEM SELECTOR PLAN 6
none, as he is ambulatory    Dispo:  -PT consult ordered, will follow recommendations   -F/u  regarding home services Lovenox    Dispo:  -PT consult ordered, will follow recommendations   -F/u  regarding home services

## 2017-06-29 NOTE — PROGRESS NOTE ADULT - SUBJECTIVE AND OBJECTIVE BOX
Patient is a 82y old  Male who presents with a chief complaint of Blurry vision (26 Jun 2017 16:54)        SUBJECTIVE / OVERNIGHT EVENTS: Patient had no acute events overnight. Patient says that he was able to see a bit better last night after receiving his eye drops, but this morning he continues to complain of blurry vision.       MEDICATIONS  (STANDING):  insulin lispro (HumaLOG) corrective regimen sliding scale   SubCutaneous three times a day before meals  insulin lispro (HumaLOG) corrective regimen sliding scale   SubCutaneous at bedtime  dextrose 50% Injectable 25 Gram(s) IV Push once  simvastatin 20 milliGRAM(s) Oral at bedtime  doxazosin 2 milliGRAM(s) Oral at bedtime  latanoprost 0.005% Ophthalmic Solution 1 Drop(s) Both EYES at bedtime  brimonidine 0.2% Ophthalmic Solution 1 Drop(s) Both EYES two times a day  timolol 0.5% Solution 1 Drop(s) Both EYES two times a day  artificial tears (preservative free) Ophthalmic Solution 1 Drop(s) Both EYES four times a day  insulin glargine Injectable (LANTUS) 5 Unit(s) SubCutaneous at bedtime    MEDICATIONS  (PRN):      Vital Signs Last 24 Hrs  T(C): 36.6 (06-29-17 @ 06:34), Max: 36.6 (06-28-17 @ 15:00)  HR: 63 (06-29-17 @ 06:34) (53 - 78)  BP: 118/66 (06-29-17 @ 06:34) (104/56 - 119/56)  RR: 18 (06-29-17 @ 06:34) (17 - 18)  SpO2: 100% (06-29-17 @ 06:34) (99% - 100%)  CAPILLARY BLOOD GLUCOSE  177 (29 Jun 2017 08:48)  269 (28 Jun 2017 22:06)  204 (28 Jun 2017 16:52)  225 (28 Jun 2017 12:40)        I&O's Summary    28 Jun 2017 07:01  -  29 Jun 2017 07:00  --------------------------------------------------------  IN: 0 mL / OUT: 1000 mL / NET: -1000 mL        Physical Exam:   General: WN/WD NAD  HEENT:  Markedly diminished visual acuity B/L, PERRLA, EOMI, moist mucous membranes  Neurology: A&Ox3, nonfocal, SALCIDO x 4  Respiratory: CTA B/L, normal respiratory effort, no wheezes, crackles, rales  CV: RRR, S1S2, no murmurs, rubs or gallops  Abdominal: Soft, NT, ND +BS, Last BM  Extremities: No edema, + peripheral pulses  Skin: no petechia, rashes seen                Consultant(s) Notes Reviewed:  OPTHO Patient is a 82y old  Male who presents with a chief complaint of Blurry vision (26 Jun 2017 16:54)        SUBJECTIVE / OVERNIGHT EVENTS: Patient had no acute events overnight. Patient says that he was able to see a bit better last night after receiving his eye drops, but this morning he continues to complain of blurry vision.       MEDICATIONS  (STANDING):  insulin lispro (HumaLOG) corrective regimen sliding scale   SubCutaneous three times a day before meals  insulin lispro (HumaLOG) corrective regimen sliding scale   SubCutaneous at bedtime  dextrose 50% Injectable 25 Gram(s) IV Push once  simvastatin 20 milliGRAM(s) Oral at bedtime  doxazosin 2 milliGRAM(s) Oral at bedtime  latanoprost 0.005% Ophthalmic Solution 1 Drop(s) Both EYES at bedtime  brimonidine 0.2% Ophthalmic Solution 1 Drop(s) Both EYES two times a day  timolol 0.5% Solution 1 Drop(s) Both EYES two times a day  artificial tears (preservative free) Ophthalmic Solution 1 Drop(s) Both EYES four times a day  insulin glargine Injectable (LANTUS) 5 Unit(s) SubCutaneous at bedtime    MEDICATIONS  (PRN):      Vital Signs Last 24 Hrs  T(C): 36.6 (06-29-17 @ 06:34), Max: 36.6 (06-28-17 @ 15:00)  HR: 63 (06-29-17 @ 06:34) (53 - 78)  BP: 118/66 (06-29-17 @ 06:34) (104/56 - 119/56)  RR: 18 (06-29-17 @ 06:34) (17 - 18)  SpO2: 100% (06-29-17 @ 06:34) (99% - 100%)  CAPILLARY BLOOD GLUCOSE  177 (29 Jun 2017 08:48)  269 (28 Jun 2017 22:06)  204 (28 Jun 2017 16:52)  225 (28 Jun 2017 12:40)        I&O's Summary    28 Jun 2017 07:01  -  29 Jun 2017 07:00  --------------------------------------------------------  IN: 0 mL / OUT: 1000 mL / NET: -1000 mL        Physical Exam:   General: WN/WD NAD  HEENT:  Markedly diminished visual acuity B/L, PERRLA, EOMI, moist mucous membranes  Neurology: A&Ox3, nonfocal, SALCIDO x 4  Respiratory: CTA B/L, normal respiratory effort, no wheezes, crackles, rales  CV: RRR, S1S2, no murmurs, rubs or gallops  Abdominal: Soft, NT, ND +BS, Last BM  Extremities: No edema, + peripheral pulses  Skin: no petechia, rashes seen      Consultant(s) Notes Reviewed:  OPTHO

## 2017-06-30 PROCEDURE — 99232 SBSQ HOSP IP/OBS MODERATE 35: CPT | Mod: GC

## 2017-06-30 RX ORDER — INSULIN GLARGINE 100 [IU]/ML
10 INJECTION, SOLUTION SUBCUTANEOUS AT BEDTIME
Qty: 0 | Refills: 0 | Status: DISCONTINUED | OUTPATIENT
Start: 2017-06-30 | End: 2017-07-05

## 2017-06-30 RX ORDER — ACETAMINOPHEN 500 MG
325 TABLET ORAL EVERY 6 HOURS
Qty: 0 | Refills: 0 | Status: DISCONTINUED | OUTPATIENT
Start: 2017-06-30 | End: 2017-07-05

## 2017-06-30 RX ORDER — ACETAMINOPHEN 500 MG
650 TABLET ORAL EVERY 6 HOURS
Qty: 0 | Refills: 0 | Status: DISCONTINUED | OUTPATIENT
Start: 2017-06-30 | End: 2017-06-30

## 2017-06-30 RX ORDER — CALCIUM CARBONATE 500(1250)
2 TABLET ORAL
Qty: 0 | Refills: 0 | Status: DISCONTINUED | OUTPATIENT
Start: 2017-06-30 | End: 2017-07-03

## 2017-06-30 RX ADMIN — Medication 4: at 13:04

## 2017-06-30 RX ADMIN — Medication 1 DROP(S): at 22:43

## 2017-06-30 RX ADMIN — Medication 1 DROP(S): at 05:47

## 2017-06-30 RX ADMIN — INSULIN GLARGINE 10 UNIT(S): 100 INJECTION, SOLUTION SUBCUTANEOUS at 22:42

## 2017-06-30 RX ADMIN — SIMVASTATIN 20 MILLIGRAM(S): 20 TABLET, FILM COATED ORAL at 21:37

## 2017-06-30 RX ADMIN — BRIMONIDINE TARTRATE 1 DROP(S): 2 SOLUTION/ DROPS OPHTHALMIC at 17:06

## 2017-06-30 RX ADMIN — Medication 1: at 22:43

## 2017-06-30 RX ADMIN — ENOXAPARIN SODIUM 40 MILLIGRAM(S): 100 INJECTION SUBCUTANEOUS at 11:06

## 2017-06-30 RX ADMIN — Medication 1 DROP(S): at 11:06

## 2017-06-30 RX ADMIN — Medication 325 MILLIGRAM(S): at 21:37

## 2017-06-30 RX ADMIN — Medication 2 TABLET(S): at 18:50

## 2017-06-30 RX ADMIN — BRIMONIDINE TARTRATE 1 DROP(S): 2 SOLUTION/ DROPS OPHTHALMIC at 05:46

## 2017-06-30 RX ADMIN — Medication 650 MILLIGRAM(S): at 05:45

## 2017-06-30 RX ADMIN — Medication 2 MILLIGRAM(S): at 21:37

## 2017-06-30 RX ADMIN — Medication 1: at 08:57

## 2017-06-30 RX ADMIN — LATANOPROST 1 DROP(S): 0.05 SOLUTION/ DROPS OPHTHALMIC; TOPICAL at 21:37

## 2017-06-30 RX ADMIN — Medication 1 DROP(S): at 17:07

## 2017-06-30 RX ADMIN — Medication 1 DROP(S): at 17:59

## 2017-06-30 RX ADMIN — Medication 650 MILLIGRAM(S): at 06:45

## 2017-06-30 RX ADMIN — Medication 325 MILLIGRAM(S): at 22:30

## 2017-06-30 RX ADMIN — Medication 2: at 18:00

## 2017-06-30 NOTE — PROGRESS NOTE ADULT - PROBLEM SELECTOR PLAN 2
Patient on oral hypoglycemics at home. Previous A1c was from 2016 and was 9.8  -FSG high this morning, start Lantus 10 units along with sliding coverage   -ISS, FSG

## 2017-06-30 NOTE — PROGRESS NOTE ADULT - PROBLEM SELECTOR PLAN 6
Lovenox    Dispo:  -PT consult ordered, will follow recommendations   -F/u  regarding home services or rehab placement

## 2017-06-30 NOTE — PROGRESS NOTE ADULT - SUBJECTIVE AND OBJECTIVE BOX
Patient is a 82y old  Male who presents with a chief complaint of Blurry vision (26 Jun 2017 16:54)        SUBJECTIVE / OVERNIGHT EVENTS:      MEDICATIONS  (STANDING):  insulin lispro (HumaLOG) corrective regimen sliding scale   SubCutaneous three times a day before meals  insulin lispro (HumaLOG) corrective regimen sliding scale   SubCutaneous at bedtime  dextrose 50% Injectable 25 Gram(s) IV Push once  simvastatin 20 milliGRAM(s) Oral at bedtime  doxazosin 2 milliGRAM(s) Oral at bedtime  latanoprost 0.005% Ophthalmic Solution 1 Drop(s) Both EYES at bedtime  brimonidine 0.2% Ophthalmic Solution 1 Drop(s) Both EYES two times a day  timolol 0.5% Solution 1 Drop(s) Both EYES two times a day  artificial tears (preservative free) Ophthalmic Solution 1 Drop(s) Both EYES four times a day  enoxaparin Injectable 40 milliGRAM(s) SubCutaneous every 24 hours  insulin glargine Injectable (LANTUS) 10 Unit(s) SubCutaneous at bedtime    MEDICATIONS  (PRN):  acetaminophen   Tablet. 650 milliGRAM(s) Oral every 6 hours PRN Moderate Pain (4 - 6)      Vital Signs Last 24 Hrs  T(C): 36.9 (06-29-17 @ 21:39), Max: 36.9 (06-29-17 @ 21:39)  HR: 86 (06-29-17 @ 21:39) (86 - 88)  BP: 108/56 (06-29-17 @ 21:39) (108/56 - 121/76)  RR: 18 (06-29-17 @ 21:39) (17 - 18)  SpO2: 100% (06-29-17 @ 21:39) (100% - 100%)  CAPILLARY BLOOD GLUCOSE  334 (30 Jun 2017 12:21)  194 (30 Jun 2017 08:45)  280 (29 Jun 2017 22:09)  225 (29 Jun 2017 17:02)        I&O's Summary      Physical Exam:   General: WN/WD NAD  HEENT:  Markedly diminished visual acuity B/L, PERRLA, EOMI, moist mucous membranes  Neurology: A&Ox3, nonfocal, SALCIDO x 4  Respiratory: CTA B/L, normal respiratory effort, no wheezes, crackles, rales  CV: RRR, S1S2, no murmurs, rubs or gallops  Abdominal: Soft, NT, ND +BS, Last BM  Extremities: No edema, + peripheral pulses  Skin: no petechia, rashes seen      Consultant(s) Notes Reviewed:  optho

## 2017-07-01 PROCEDURE — 99232 SBSQ HOSP IP/OBS MODERATE 35: CPT | Mod: GC

## 2017-07-01 RX ORDER — INSULIN LISPRO 100/ML
2 VIAL (ML) SUBCUTANEOUS
Qty: 0 | Refills: 0 | Status: DISCONTINUED | OUTPATIENT
Start: 2017-07-01 | End: 2017-07-03

## 2017-07-01 RX ADMIN — INSULIN GLARGINE 10 UNIT(S): 100 INJECTION, SOLUTION SUBCUTANEOUS at 22:40

## 2017-07-01 RX ADMIN — Medication 2 MILLIGRAM(S): at 21:52

## 2017-07-01 RX ADMIN — ENOXAPARIN SODIUM 40 MILLIGRAM(S): 100 INJECTION SUBCUTANEOUS at 12:32

## 2017-07-01 RX ADMIN — Medication 325 MILLIGRAM(S): at 06:27

## 2017-07-01 RX ADMIN — Medication 1 DROP(S): at 05:27

## 2017-07-01 RX ADMIN — Medication 2 UNIT(S): at 17:47

## 2017-07-01 RX ADMIN — Medication 1 DROP(S): at 12:33

## 2017-07-01 RX ADMIN — BRIMONIDINE TARTRATE 1 DROP(S): 2 SOLUTION/ DROPS OPHTHALMIC at 05:27

## 2017-07-01 RX ADMIN — Medication 325 MILLIGRAM(S): at 19:28

## 2017-07-01 RX ADMIN — Medication 1 DROP(S): at 17:46

## 2017-07-01 RX ADMIN — Medication 2 UNIT(S): at 12:34

## 2017-07-01 RX ADMIN — Medication 325 MILLIGRAM(S): at 05:27

## 2017-07-01 RX ADMIN — BRIMONIDINE TARTRATE 1 DROP(S): 2 SOLUTION/ DROPS OPHTHALMIC at 17:46

## 2017-07-01 RX ADMIN — Medication 325 MILLIGRAM(S): at 20:00

## 2017-07-01 RX ADMIN — Medication 1: at 08:52

## 2017-07-01 RX ADMIN — Medication 1: at 17:47

## 2017-07-01 RX ADMIN — LATANOPROST 1 DROP(S): 0.05 SOLUTION/ DROPS OPHTHALMIC; TOPICAL at 21:51

## 2017-07-01 RX ADMIN — SIMVASTATIN 20 MILLIGRAM(S): 20 TABLET, FILM COATED ORAL at 21:52

## 2017-07-01 RX ADMIN — Medication 2: at 12:34

## 2017-07-01 NOTE — PROGRESS NOTE ADULT - SUBJECTIVE AND OBJECTIVE BOX
Patient is a 82y old  Male who presents with a chief complaint of Blurry vision (26 Jun 2017 16:54)      SUBJECTIVE / OVERNIGHT EVENTS:  No acute events overnight; No changes in his vision. Patient denies dizziness or headaches.     MEDICATIONS  (STANDING):  insulin lispro (HumaLOG) corrective regimen sliding scale   SubCutaneous three times a day before meals  insulin lispro (HumaLOG) corrective regimen sliding scale   SubCutaneous at bedtime  dextrose 50% Injectable 25 Gram(s) IV Push once  simvastatin 20 milliGRAM(s) Oral at bedtime  doxazosin 2 milliGRAM(s) Oral at bedtime  latanoprost 0.005% Ophthalmic Solution 1 Drop(s) Both EYES at bedtime  brimonidine 0.2% Ophthalmic Solution 1 Drop(s) Both EYES two times a day  timolol 0.5% Solution 1 Drop(s) Both EYES two times a day  artificial tears (preservative free) Ophthalmic Solution 1 Drop(s) Both EYES four times a day  enoxaparin Injectable 40 milliGRAM(s) SubCutaneous every 24 hours  insulin glargine Injectable (LANTUS) 10 Unit(s) SubCutaneous at bedtime    MEDICATIONS  (PRN):  calcium carbonate 500 mG (Tums) Chewable 2 Tablet(s) Chew two times a day PRN Indigestion  acetaminophen   Tablet. 325 milliGRAM(s) Oral every 6 hours PRN Moderate Pain (4 - 6)      Vital Signs Last 24 Hrs  T(C): 37.2 (07-01-17 @ 05:23), Max: 37.2 (07-01-17 @ 05:23)  HR: 79 (07-01-17 @ 05:23) (73 - 92)  BP: 111/68 (07-01-17 @ 05:23) (111/68 - 132/66)  RR: 18 (07-01-17 @ 05:23) (18 - 18)  SpO2: 99% (07-01-17 @ 05:23) (99% - 100%)  CAPILLARY BLOOD GLUCOSE  190 (01 Jul 2017 08:28)  272 (30 Jun 2017 22:30)  209 (30 Jun 2017 17:32)  334 (30 Jun 2017 12:21)        I&O's Summary      PHYSICAL EXAM:  GENERAL: NAD, cachetic  HEAD:  Atraumatic, Normocephalic  EYES: EOMI, PERRLA, conjunctiva and sclera clear - visual acuity impaired.   NECK: Supple, No JVD  CHEST/LUNG: Clear to auscultation bilaterally; No wheeze  HEART: Regular rate and rhythm; No murmurs, rubs, or gallops  ABDOMEN: Soft, Nontender, Nondistended; Bowel sounds present  EXTREMITIES:  2+ Peripheral Pulses, No clubbing, cyanosis, or edema  PSYCH: AAOx3  NEUROLOGY: non-focal  SKIN: No rashes or lesions

## 2017-07-01 NOTE — PROGRESS NOTE ADULT - PROBLEM/PLAN-3
January 6, 2017      Berny Campbell MD  [address]  [facility] [address]           [facility] - GYN Oncology  [address]  [place] [address]  Phone: [phone]          Patient: Josseline Harper   MR Number: [id number]   YOB: 1975   Date of Visit: 1/4/2017       Dear Dr. Berny Campbell:    Thank you for referring Josseline Harper to me for evaluation. Attached you will find relevant portions of my assessment and plan of care.    If you have questions, please do not hesitate to call me. I look forward to following Josseline Harper along with you.    Sincerely,    Jake Molina MD    Enclosure  CC:  No Recipients    If you would like to receive this communication electronically, please contact [email or web address] or [phone] to request more information on [employer] access.    For providers and/or their staff who would like to refer a patient to [facility], please contact us through our one-stop-shop provider referral line, [facility], at [phone].    If you feel you have received this communication in error or would no longer like to receive these types of communications, please e-mail [email or web address]          DISPLAY PLAN FREE TEXT

## 2017-07-01 NOTE — PROGRESS NOTE ADULT - PROBLEM SELECTOR PLAN 6
Lovenox    Dispo: rehab placement   -PT consult ordered, will follow recommendations   -F/u  regarding home services or rehab placement

## 2017-07-02 ENCOUNTER — TRANSCRIPTION ENCOUNTER (OUTPATIENT)
Age: 82
End: 2017-07-02

## 2017-07-02 PROCEDURE — 99232 SBSQ HOSP IP/OBS MODERATE 35: CPT | Mod: GC

## 2017-07-02 RX ORDER — SENNA PLUS 8.6 MG/1
2 TABLET ORAL AT BEDTIME
Qty: 0 | Refills: 0 | Status: DISCONTINUED | OUTPATIENT
Start: 2017-07-02 | End: 2017-07-05

## 2017-07-02 RX ORDER — DOCUSATE SODIUM 100 MG
100 CAPSULE ORAL DAILY
Qty: 0 | Refills: 0 | Status: DISCONTINUED | OUTPATIENT
Start: 2017-07-02 | End: 2017-07-05

## 2017-07-02 RX ADMIN — SENNA PLUS 2 TABLET(S): 8.6 TABLET ORAL at 21:41

## 2017-07-02 RX ADMIN — Medication 325 MILLIGRAM(S): at 22:30

## 2017-07-02 RX ADMIN — Medication 2: at 18:17

## 2017-07-02 RX ADMIN — Medication 100 MILLIGRAM(S): at 13:03

## 2017-07-02 RX ADMIN — Medication 1 DROP(S): at 18:18

## 2017-07-02 RX ADMIN — Medication 3: at 13:05

## 2017-07-02 RX ADMIN — Medication 2 UNIT(S): at 13:05

## 2017-07-02 RX ADMIN — BRIMONIDINE TARTRATE 1 DROP(S): 2 SOLUTION/ DROPS OPHTHALMIC at 18:19

## 2017-07-02 RX ADMIN — Medication 1 DROP(S): at 18:19

## 2017-07-02 RX ADMIN — LATANOPROST 1 DROP(S): 0.05 SOLUTION/ DROPS OPHTHALMIC; TOPICAL at 21:20

## 2017-07-02 RX ADMIN — Medication 325 MILLIGRAM(S): at 10:00

## 2017-07-02 RX ADMIN — Medication 2 UNIT(S): at 09:27

## 2017-07-02 RX ADMIN — Medication 325 MILLIGRAM(S): at 21:39

## 2017-07-02 RX ADMIN — SIMVASTATIN 20 MILLIGRAM(S): 20 TABLET, FILM COATED ORAL at 21:38

## 2017-07-02 RX ADMIN — Medication 325 MILLIGRAM(S): at 09:27

## 2017-07-02 RX ADMIN — Medication 1 DROP(S): at 13:04

## 2017-07-02 RX ADMIN — ENOXAPARIN SODIUM 40 MILLIGRAM(S): 100 INJECTION SUBCUTANEOUS at 13:04

## 2017-07-02 RX ADMIN — INSULIN GLARGINE 10 UNIT(S): 100 INJECTION, SOLUTION SUBCUTANEOUS at 21:38

## 2017-07-02 RX ADMIN — Medication 2 MILLIGRAM(S): at 21:38

## 2017-07-02 RX ADMIN — Medication 1 DROP(S): at 05:38

## 2017-07-02 RX ADMIN — Medication 2 UNIT(S): at 18:18

## 2017-07-02 RX ADMIN — Medication: at 22:57

## 2017-07-02 RX ADMIN — BRIMONIDINE TARTRATE 1 DROP(S): 2 SOLUTION/ DROPS OPHTHALMIC at 05:40

## 2017-07-02 RX ADMIN — Medication 1 DROP(S): at 05:39

## 2017-07-02 RX ADMIN — Medication 1 DROP(S): at 00:04

## 2017-07-02 NOTE — DISCHARGE NOTE ADULT - HOSPITAL COURSE
82y M with PMHX of Glaucoma, DM2, BPH, HLD p/w worsening blurry vision presenting with blurry vision after an opthalmology appointment. Patient felt unsafe traveling, and was admitted to the hospital. 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 They    recommended Glaucoma drops: Latanoprost qHS, Combigan BID, and artifical tears. Opthalmology recommended follow up with [facility] [address] [phone] within 1 week of discharge. Patient's vision was only slightly improved with drops, and patient continued to experience blurry vision. Patient's admission was complicated by high FSG's, and patient's insulin coverage was adjusted accordingly. Patient was seen by physical therapy, who recommended that patient be sent to a rehab facility due to his limited functional ability at this point.       Patient was discharged to _________, and will follow up with _______ 1 week from discharge. 82y M with PMHX of Glaucoma, DM2, BPH, HLD p/w worsening blurry vision presenting with blurry vision after an opthalmology appointment. Patient felt unsafe traveling, and was admitted to the hospital. 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 They    recommended Glaucoma drops: Latanoprost qHS, Combigan BID, and artifical tears. Opthalmology recommended follow up with [facility] [address] [phone] within 1 week of discharge. Patient's vision was only slightly improved with drops, and patient continued to experience blurry vision. Patient's admission was complicated by high FSG's, and patient's insulin coverage was adjusted accordingly. Patient was seen by physical therapy, who recommended that patient be sent to a rehab facility due to his limited functional ability at this point.     Patient was discharged on ____ to ______ rehab facility.     Patient was discharged to _________, and will follow up with _______ 1 week from discharge. 82y M with PMHX of Glaucoma, DM2, BPH, HLD p/w worsening blurry vision presenting with blurry vision after an opthalmology appointment. Patient felt unsafe traveling, and was admitted to the hospital. 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 They    recommended Glaucoma drops: Latanoprost qHS, Combigan BID, and artifical tears. Opthalmology recommended follow up with [facility] [address] [phone] within 1 week of discharge. Patient's vision was only slightly improved with drops, and patient continued to experience blurry vision. Patient's admission was complicated by high FSG's, and patient's insulin coverage was adjusted accordingly. Patient was seen by physical therapy, who recommended that patient be sent to a rehab facility due to his limited functional ability at this point.     Patient was discharged on 7/5/17 to rehab facility. 82y M with PMHX of Glaucoma, DM2, BPH, HLD p/w worsening blurry vision presenting with blurry vision after an opthalmology appointment. Patient felt unsafe traveling, and was admitted to the hospital. 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 They    recommended Glaucoma drops: Latanoprost qHS, Combigan BID, and artifical tears. Opthalmology recommended follow up with [facility] [address] [phone] within 1 week of discharge. Patient's vision was only slightly improved with drops, and patient continued to experience blurry vision. Patient's admission was complicated by high FSG's, and patient's insulin coverage was adjusted accordingly. Patient was seen by physical therapy, who recommended that patient be sent to a rehab facility due to his limited functional ability at this point.     Patient was discharged on 7/5/17 to rehab facility as discussed.

## 2017-07-02 NOTE — PROGRESS NOTE ADULT - SUBJECTIVE AND OBJECTIVE BOX
Patient is a 82y old  Male who presents with a chief complaint of Blurry vision (26 Jun 2017 16:54)        SUBJECTIVE / OVERNIGHT EVENTS: Patient had no acute events overnight. Patient still complains of foggy vision. Patient also complains of back pain, tylenol 350 mg helped with the pain.       MEDICATIONS  (STANDING):  insulin lispro (HumaLOG) corrective regimen sliding scale   SubCutaneous three times a day before meals  insulin lispro (HumaLOG) corrective regimen sliding scale   SubCutaneous at bedtime  dextrose 50% Injectable 25 Gram(s) IV Push once  simvastatin 20 milliGRAM(s) Oral at bedtime  doxazosin 2 milliGRAM(s) Oral at bedtime  latanoprost 0.005% Ophthalmic Solution 1 Drop(s) Both EYES at bedtime  brimonidine 0.2% Ophthalmic Solution 1 Drop(s) Both EYES two times a day  timolol 0.5% Solution 1 Drop(s) Both EYES two times a day  artificial tears (preservative free) Ophthalmic Solution 1 Drop(s) Both EYES four times a day  enoxaparin Injectable 40 milliGRAM(s) SubCutaneous every 24 hours  insulin glargine Injectable (LANTUS) 10 Unit(s) SubCutaneous at bedtime  insulin lispro Injectable (HumaLOG) 2 Unit(s) SubCutaneous three times a day before meals    MEDICATIONS  (PRN):  calcium carbonate 500 mG (Tums) Chewable 2 Tablet(s) Chew two times a day PRN Indigestion  acetaminophen   Tablet. 325 milliGRAM(s) Oral every 6 hours PRN Moderate Pain (4 - 6)      Vital Signs Last 24 Hrs  T(C): 37.4 (07-02-17 @ 05:31), Max: 37.4 (07-02-17 @ 05:31)  HR: 66 (07-02-17 @ 05:31) (66 - 71)  BP: 112/64 (07-02-17 @ 05:31) (107/65 - 120/65)  RR: 18 (07-02-17 @ 05:31) (18 - 18)  SpO2: 98% (07-02-17 @ 05:31) (98% - 100%)  CAPILLARY BLOOD GLUCOSE  147 (02 Jul 2017 08:43)  208 (01 Jul 2017 22:37)  196 (01 Jul 2017 17:48)  220 (01 Jul 2017 12:23)        I&O's Summary    01 Jul 2017 07:01  -  02 Jul 2017 07:00  --------------------------------------------------------  IN: 200 mL / OUT: 600 mL / NET: -400 mL    General: WN/WD NAD  HEENT:  Markedly diminished visual acuity B/L, PERRLA, EOMI, moist mucous membranes  Neurology: A&Ox3, nonfocal, SALCIDO x 4  Respiratory: CTA B/L, normal respiratory effort, no wheezes, crackles, rales  CV: RRR, S1S2, no murmurs, rubs or gallops  Abdominal: Soft, NT, ND +BS, Last BM  Extremities: No edema, + peripheral pulses  Skin: no petechia, rashes seen      LABS: no labs today

## 2017-07-02 NOTE — DISCHARGE NOTE ADULT - ABILITY TO HEAR (WITH HEARING AID OR HEARING APPLIANCE IF NORMALLY USED):
Mildly to Moderately Impaired: difficulty hearing in some environments or speaker may need to increase volume or speak distinctly/right ear [place]

## 2017-07-02 NOTE — DISCHARGE NOTE ADULT - CONDITIONS AT DISCHARGE
pt alert and stable. clinically stable to be discharged to Rehab. 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 IV hep lock removed. safety maintained. denies pain and discomfort. will continue to monitor. pt seen by PT today.

## 2017-07-02 NOTE — DISCHARGE NOTE ADULT - PATIENT PORTAL LINK FT
“You can access the [employer] Patient Portal, offered by [facility], by registering with the following website: [email or web address]”

## 2017-07-02 NOTE — DISCHARGE NOTE ADULT - MEDICATION SUMMARY - MEDICATIONS TO STOP TAKING
I will STOP taking the medications listed below when I get home from the hospital:    metFORMIN 1000 mg oral tablet  -- 1 tab(s) by mouth 2 times a day    Januvia 100 mg oral tablet  -- 1 tab(s) by mouth once a day I will STOP taking the medications listed below when I get home from the hospital:    metFORMIN 1000 mg oral tablet  -- 1 tab(s) by mouth 2 times a day    Januvia 100 mg oral tablet  -- 1 tab(s) by mouth once a day    lisinopril 10 mg oral tablet  -- 1 tab(s) by mouth once a day

## 2017-07-02 NOTE — DISCHARGE NOTE ADULT - MEDICATION SUMMARY - MEDICATIONS TO TAKE
I will START or STAY ON the medications listed below when I get home from the hospital:    lisinopril 10 mg oral tablet  -- 1 tab(s) by mouth once a day  -- Indication: For Hypertension    doxazosin 2 mg oral tablet  -- 1 tab(s) by mouth once a day  -- Indication: For BPH (benign prostatic hyperplasia)    insulin lispro 100 units/mL subcutaneous solution  --  subcutaneous 3 times a day (before meals) ; 1 Unit(s) if Glucose 151 - 200  2 Unit(s) if Glucose 201 - 250  3 Unit(s) if Glucose 251 - 300  4 Unit(s) if Glucose 301 - 350  5 Unit(s) if Glucose 351 - 400  6 Unit(s) if Glucose GREATER THAN 400  -- Indication: For 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, with long-term current use of insulin    insulin lispro 100 units/mL subcutaneous solution  -- 3 unit(s) subcutaneous 3 times a day (before meals)  -- Indication: For Type 2 diabetes mellitus with retinopathy of both eyes, without long-term current use of insulin, macular edema presence unspecified, unspecified retinopathy severity    insulin glargine  -- 10 unit(s) subcutaneous once (at bedtime)  -- Indication: For Type 2 diabetes mellitus with retinopathy of both eyes, without long-term current use of insulin, macular edema presence unspecified, unspecified retinopathy severity    simvastatin 20 mg oral tablet  -- 1 tab(s) by mouth once a day (at bedtime)  -- Indication: For HLD (hyperlipidemia)    senna oral tablet  -- 2 tab(s) by mouth once a day (at bedtime)  -- Indication: For constipation    docusate sodium 100 mg oral capsule  -- 1 cap(s) by mouth once a day  -- Indication: For constipation    ocular lubricant ophthalmic solution  -- 1 drop(s) to each affected eye 4 times a day  -- Indication: For Glaucoma    latanoprost 0.005% ophthalmic solution  -- 1 drop(s) to each affected eye once a day (at bedtime)  -- Indication: For Glaucoma    timolol maleate 0.5% ophthalmic solution  -- 1 drop(s) to each affected eye 2 times a day  -- Indication: For Glaucoma    brimonidine 0.2% ophthalmic solution  -- 1 drop(s) to each affected eye 2 times a day  -- Indication: For Glaucoma I will START or STAY ON the medications listed below when I get home from the hospital:    doxazosin 2 mg oral tablet  -- 1 tab(s) by mouth once a day  -- Indication: For BPH (benign prostatic hyperplasia)    insulin lispro 100 units/mL subcutaneous solution  --  subcutaneous 3 times a day (before meals) ; 1 Unit(s) if Glucose 151 - 200  2 Unit(s) if Glucose 201 - 250  3 Unit(s) if Glucose 251 - 300  4 Unit(s) if Glucose 301 - 350  5 Unit(s) if Glucose 351 - 400  6 Unit(s) if Glucose GREATER THAN 400  -- Indication: For 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, with long-term current use of insulin    insulin lispro 100 units/mL subcutaneous solution  -- 3 unit(s) subcutaneous 3 times a day (before meals)  -- Indication: For Type 2 diabetes mellitus with retinopathy of both eyes, without long-term current use of insulin, macular edema presence unspecified, unspecified retinopathy severity    insulin glargine  -- 10 unit(s) subcutaneous once (at bedtime)  -- Indication: For Type 2 diabetes mellitus with retinopathy of both eyes, without long-term current use of insulin, macular edema presence unspecified, unspecified retinopathy severity    simvastatin 20 mg oral tablet  -- 1 tab(s) by mouth once a day (at bedtime)  -- Indication: For HLD (hyperlipidemia)    senna oral tablet  -- 2 tab(s) by mouth once a day (at bedtime)  -- Indication: For constipation    docusate sodium 100 mg oral capsule  -- 1 cap(s) by mouth once a day  -- Indication: For constipation    ocular lubricant ophthalmic solution  -- 1 drop(s) to each affected eye 4 times a day  -- Indication: For Glaucoma    latanoprost 0.005% ophthalmic solution  -- 1 drop(s) to each affected eye once a day (at bedtime)  -- Indication: For Glaucoma    timolol maleate 0.5% ophthalmic solution  -- 1 drop(s) to each affected eye 2 times a day  -- Indication: For Glaucoma    brimonidine 0.2% ophthalmic solution  -- 1 drop(s) to each affected eye 2 times a day  -- Indication: For Glaucoma

## 2017-07-02 NOTE — DISCHARGE NOTE ADULT - SECONDARY DIAGNOSIS.
Type 2 diabetes mellitus with retinopathy of both eyes, without long-term current use of insulin, macular edema presence unspecified, unspecified retinopathy severity

## 2017-07-02 NOTE — DISCHARGE NOTE ADULT - CARE PLAN
Principal Discharge DX:	Primary open angle glaucoma of both eyes, unspecified glaucoma stage  Goal:	stabilize  Instructions for follow-up, activity and diet:	Continue Glaucoma drops: Latanoprost qHS, Combigan BID, and artifical tears.  Secondary Diagnosis:	Type 2 diabetes mellitus with retinopathy of both eyes, without long-term current use of insulin, macular edema presence unspecified, unspecified retinopathy severity  Goal:	stabilize  Instructions for follow-up, activity and diet:	Continue oral hypoglycemics at home Principal Discharge DX:	Primary open angle glaucoma of both eyes, unspecified glaucoma stage  Goal:	stabilize  Instructions for follow-up, activity and diet:	Continue Glaucoma drops: Latanoprost qHS, Combigan BID, and artifical tears. Follow up with [facility] [address] [phone]  Secondary Diagnosis:	Type 2 diabetes mellitus with retinopathy of both eyes, without long-term current use of insulin, macular edema presence unspecified, unspecified retinopathy severity  Goal:	stabilize  Instructions for follow-up, activity and diet:	Continue oral hypoglycemics at home. Follow up with PCP for future glycemic control, and repeat HgbA1C Principal Discharge DX:	Primary open angle glaucoma of both eyes, unspecified glaucoma stage  Goal:	stabilize  Instructions for follow-up, activity and diet:	Continue Glaucoma drops: Latanoprost qHS, Combigan BID, and artifical tears. Follow up with [facility] [address] [phone]  Secondary Diagnosis:	Type 2 diabetes mellitus with retinopathy of both eyes, without long-term current use of insulin, macular edema presence unspecified, unspecified retinopathy severity  Goal:	stabilize  Instructions for follow-up, activity and diet:	Continue oral hypoglycemics at home. Follow up with PCP for future glycemic control, and repeat HgbA1C Principal Discharge DX:	Primary open angle glaucoma of both eyes, unspecified glaucoma stage  Goal:	stabilize  Instructions for follow-up, activity and diet:	Continue Glaucoma drops: Latanoprost qHS, Combigan BID, and artifical tears. Follow up with [facility] [address] [phone]  Secondary Diagnosis:	Type 2 diabetes mellitus with retinopathy of both eyes, without long-term current use of insulin, macular edema presence unspecified, unspecified retinopathy severity  Goal:	stabilize  Instructions for follow-up, activity and diet:	Continue oral hypoglycemics at home. Follow up with PCP for future glycemic control, and repeat HgbA1C Principal Discharge DX:	Primary open angle glaucoma of both eyes, unspecified glaucoma stage  Goal:	stabilize  Instructions for follow-up, activity and diet:	Continue Glaucoma drops: Latanoprost qHS, Combigan BID, and artifical tears. Follow up with [facility] [address] [phone]  Secondary Diagnosis:	Type 2 diabetes mellitus with retinopathy of both eyes, without long-term current use of insulin, macular edema presence unspecified, unspecified retinopathy severity  Goal:	stabilize  Instructions for follow-up, activity and diet:	Continue oral hypoglycemics at home. Follow up with PCP for future glycemic control, and repeat HgbA1C Principal Discharge DX:	Primary open angle glaucoma of both eyes, unspecified glaucoma stage  Goal:	stabilize  Instructions for follow-up, activity and diet:	Continue Glaucoma drops: Latanoprost qHS, Combigan BID, and artifical tears. Follow up with [facility] [address] [phone]  Secondary Diagnosis:	Type 2 diabetes mellitus with retinopathy of both eyes, without long-term current use of insulin, macular edema presence unspecified, unspecified retinopathy severity  Goal:	stabilize  Instructions for follow-up, activity and diet:	Continue oral hypoglycemics at home. Follow up with PCP for future glycemic control, and repeat HgbA1C Principal Discharge DX:	Primary open angle glaucoma of both eyes, unspecified glaucoma stage  Goal:	stabilize  Instructions for follow-up, activity and diet:	Continue Glaucoma drops: Latanoprost qHS, Combigan BID, and artifical tears. Follow up with [facility] [address] [phone]  Secondary Diagnosis:	Type 2 diabetes mellitus with retinopathy of both eyes, without long-term current use of insulin, macular edema presence unspecified, unspecified retinopathy severity  Goal:	stabilize  Instructions for follow-up, activity and diet:	Continue oral hypoglycemics at home. Follow up with PCP for future glycemic control, and repeat HgbA1C

## 2017-07-02 NOTE — DISCHARGE NOTE ADULT - PLAN OF CARE
stabilize Continue Glaucoma drops: Latanoprost qHS, Combigan BID, and artifical tears. Continue oral hypoglycemics at home Continue Glaucoma drops: Latanoprost qHS, Combigan BID, and artifical tears. Follow up with [facility] or [address] [place] [address] [phone] Continue oral hypoglycemics at home. Follow up with PCP for future glycemic control, and repeat HgbA1C

## 2017-07-03 PROCEDURE — 99232 SBSQ HOSP IP/OBS MODERATE 35: CPT | Mod: GC

## 2017-07-03 RX ORDER — INSULIN GLARGINE 100 [IU]/ML
10 INJECTION, SOLUTION SUBCUTANEOUS
Qty: 0 | Refills: 0 | DISCHARGE
Start: 2017-07-03

## 2017-07-03 RX ORDER — INSULIN LISPRO 100/ML
3 VIAL (ML) SUBCUTANEOUS
Qty: 0 | Refills: 0 | Status: DISCONTINUED | OUTPATIENT
Start: 2017-07-03 | End: 2017-07-05

## 2017-07-03 RX ORDER — BRIMONIDINE TARTRATE 2 MG/MG
1 SOLUTION/ DROPS OPHTHALMIC
Qty: 0 | Refills: 0 | DISCHARGE
Start: 2017-07-03

## 2017-07-03 RX ORDER — DOCUSATE SODIUM 100 MG
1 CAPSULE ORAL
Qty: 0 | Refills: 0 | COMMUNITY
Start: 2017-07-03

## 2017-07-03 RX ORDER — METFORMIN HYDROCHLORIDE 850 MG/1
1 TABLET ORAL
Qty: 0 | Refills: 0 | COMMUNITY

## 2017-07-03 RX ORDER — SITAGLIPTIN 50 MG/1
1 TABLET, FILM COATED ORAL
Qty: 0 | Refills: 0 | COMMUNITY

## 2017-07-03 RX ORDER — SENNA PLUS 8.6 MG/1
2 TABLET ORAL
Qty: 0 | Refills: 0 | COMMUNITY
Start: 2017-07-03

## 2017-07-03 RX ORDER — TIMOLOL 0.5 %
1 DROPS OPHTHALMIC (EYE)
Qty: 0 | Refills: 0 | COMMUNITY
Start: 2017-07-03

## 2017-07-03 RX ORDER — INSULIN LISPRO 100/ML
3 VIAL (ML) SUBCUTANEOUS
Qty: 0 | Refills: 0 | DISCHARGE
Start: 2017-07-03

## 2017-07-03 RX ORDER — INSULIN LISPRO 100/ML
0 VIAL (ML) SUBCUTANEOUS
Qty: 0 | Refills: 0 | DISCHARGE
Start: 2017-07-03

## 2017-07-03 RX ORDER — LATANOPROST 0.05 MG/ML
1 SOLUTION/ DROPS OPHTHALMIC; TOPICAL
Qty: 0 | Refills: 0 | COMMUNITY
Start: 2017-07-03

## 2017-07-03 RX ADMIN — Medication 1 DROP(S): at 00:32

## 2017-07-03 RX ADMIN — Medication 1 DROP(S): at 11:51

## 2017-07-03 RX ADMIN — BRIMONIDINE TARTRATE 1 DROP(S): 2 SOLUTION/ DROPS OPHTHALMIC at 18:07

## 2017-07-03 RX ADMIN — LATANOPROST 1 DROP(S): 0.05 SOLUTION/ DROPS OPHTHALMIC; TOPICAL at 21:38

## 2017-07-03 RX ADMIN — BRIMONIDINE TARTRATE 1 DROP(S): 2 SOLUTION/ DROPS OPHTHALMIC at 06:09

## 2017-07-03 RX ADMIN — Medication 2 MILLIGRAM(S): at 21:38

## 2017-07-03 RX ADMIN — Medication 1 DROP(S): at 18:07

## 2017-07-03 RX ADMIN — Medication 1: at 09:13

## 2017-07-03 RX ADMIN — Medication 1 DROP(S): at 06:16

## 2017-07-03 RX ADMIN — SIMVASTATIN 20 MILLIGRAM(S): 20 TABLET, FILM COATED ORAL at 21:39

## 2017-07-03 RX ADMIN — Medication 1 DROP(S): at 05:14

## 2017-07-03 RX ADMIN — Medication 325 MILLIGRAM(S): at 12:56

## 2017-07-03 RX ADMIN — Medication 325 MILLIGRAM(S): at 13:30

## 2017-07-03 RX ADMIN — Medication 3 UNIT(S): at 18:08

## 2017-07-03 RX ADMIN — Medication 325 MILLIGRAM(S): at 21:38

## 2017-07-03 RX ADMIN — Medication 4: at 12:56

## 2017-07-03 RX ADMIN — ENOXAPARIN SODIUM 40 MILLIGRAM(S): 100 INJECTION SUBCUTANEOUS at 11:51

## 2017-07-03 RX ADMIN — Medication 1 DROP(S): at 18:08

## 2017-07-03 RX ADMIN — Medication 3 UNIT(S): at 12:56

## 2017-07-03 RX ADMIN — Medication 325 MILLIGRAM(S): at 22:38

## 2017-07-03 RX ADMIN — INSULIN GLARGINE 10 UNIT(S): 100 INJECTION, SOLUTION SUBCUTANEOUS at 22:35

## 2017-07-03 RX ADMIN — Medication 3 UNIT(S): at 09:13

## 2017-07-03 NOTE — PROGRESS NOTE ADULT - SUBJECTIVE AND OBJECTIVE BOX
Patient is a 82y old  Male who presents with a chief complaint of Blurry vision (02 Jul 2017 13:22)        SUBJECTIVE / OVERNIGHT EVENTS: Patient had no acute events overnight. Patient still complains of blurry vision.       MEDICATIONS  (STANDING):  insulin lispro (HumaLOG) corrective regimen sliding scale   SubCutaneous three times a day before meals  insulin lispro (HumaLOG) corrective regimen sliding scale   SubCutaneous at bedtime  dextrose 50% Injectable 25 Gram(s) IV Push once  simvastatin 20 milliGRAM(s) Oral at bedtime  doxazosin 2 milliGRAM(s) Oral at bedtime  latanoprost 0.005% Ophthalmic Solution 1 Drop(s) Both EYES at bedtime  brimonidine 0.2% Ophthalmic Solution 1 Drop(s) Both EYES two times a day  timolol 0.5% Solution 1 Drop(s) Both EYES two times a day  artificial tears (preservative free) Ophthalmic Solution 1 Drop(s) Both EYES four times a day  enoxaparin Injectable 40 milliGRAM(s) SubCutaneous every 24 hours  insulin glargine Injectable (LANTUS) 10 Unit(s) SubCutaneous at bedtime  senna 2 Tablet(s) Oral at bedtime  docusate sodium 100 milliGRAM(s) Oral daily  insulin lispro Injectable (HumaLOG) 3 Unit(s) SubCutaneous three times a day before meals    MEDICATIONS  (PRN):  acetaminophen   Tablet. 325 milliGRAM(s) Oral every 6 hours PRN Moderate Pain (4 - 6)      Vital Signs Last 24 Hrs  T(C): 36.9 (07-03-17 @ 06:05), Max: 37.5 (07-02-17 @ 21:44)  HR: 68 (07-03-17 @ 06:05) (68 - 86)  BP: 110/59 (07-03-17 @ 06:05) (102/56 - 117/61)  RR: 18 (07-03-17 @ 06:05) (18 - 18)  SpO2: 98% (07-03-17 @ 06:05) (98% - 100%)  CAPILLARY BLOOD GLUCOSE  258 (02 Jul 2017 22:03)  203 (02 Jul 2017 16:59)  296 (02 Jul 2017 12:22)  147 (02 Jul 2017 08:43)        I&O's Summary    02 Jul 2017 07:01  -  03 Jul 2017 07:00  --------------------------------------------------------  IN: 0 mL / OUT: 600 mL / NET: -600 mL      Physical Exam:  General: WN/WD NAD  HEENT:  Markedly diminished visual acuity B/L, PERRLA, EOMI, moist mucous membranes  Neurology: A&Ox3, nonfocal, SALCIDO x 4  Respiratory: CTA B/L, normal respiratory effort, no wheezes, crackles, rales  CV: RRR, S1S2, no murmurs, rubs or gallops  Abdominal: Soft, NT, ND +BS, Last BM  Extremities: No edema, + peripheral pulses  Skin: no petechia, rashes seen      LABS: no labs today

## 2017-07-03 NOTE — PROGRESS NOTE ADULT - PROBLEM SELECTOR PLAN 2
Patient on oral hypoglycemics at home. Previous A1c was from 2016 and was 9.8  -continue Lantus 10 units along with sliding coverage   -ISS, FSG

## 2017-07-04 PROCEDURE — 99232 SBSQ HOSP IP/OBS MODERATE 35: CPT | Mod: GC

## 2017-07-04 RX ORDER — CALCIUM CARBONATE 500(1250)
1 TABLET ORAL ONCE
Qty: 0 | Refills: 0 | Status: COMPLETED | OUTPATIENT
Start: 2017-07-04 | End: 2017-07-04

## 2017-07-04 RX ADMIN — Medication 1 DROP(S): at 00:25

## 2017-07-04 RX ADMIN — SENNA PLUS 2 TABLET(S): 8.6 TABLET ORAL at 21:48

## 2017-07-04 RX ADMIN — ENOXAPARIN SODIUM 40 MILLIGRAM(S): 100 INJECTION SUBCUTANEOUS at 13:02

## 2017-07-04 RX ADMIN — Medication 1 DROP(S): at 18:19

## 2017-07-04 RX ADMIN — Medication 1 TABLET(S): at 22:55

## 2017-07-04 RX ADMIN — Medication 3 UNIT(S): at 13:00

## 2017-07-04 RX ADMIN — BRIMONIDINE TARTRATE 1 DROP(S): 2 SOLUTION/ DROPS OPHTHALMIC at 18:18

## 2017-07-04 RX ADMIN — LATANOPROST 1 DROP(S): 0.05 SOLUTION/ DROPS OPHTHALMIC; TOPICAL at 21:49

## 2017-07-04 RX ADMIN — Medication 1 DROP(S): at 23:25

## 2017-07-04 RX ADMIN — Medication 1 DROP(S): at 13:01

## 2017-07-04 RX ADMIN — Medication 1 DROP(S): at 18:18

## 2017-07-04 RX ADMIN — Medication 2 MILLIGRAM(S): at 21:48

## 2017-07-04 RX ADMIN — INSULIN GLARGINE 10 UNIT(S): 100 INJECTION, SOLUTION SUBCUTANEOUS at 22:45

## 2017-07-04 RX ADMIN — Medication 3 UNIT(S): at 08:56

## 2017-07-04 RX ADMIN — Medication 100 MILLIGRAM(S): at 13:01

## 2017-07-04 RX ADMIN — Medication 1 DROP(S): at 05:53

## 2017-07-04 RX ADMIN — Medication 1 DROP(S): at 05:54

## 2017-07-04 RX ADMIN — Medication 3: at 13:00

## 2017-07-04 RX ADMIN — BRIMONIDINE TARTRATE 1 DROP(S): 2 SOLUTION/ DROPS OPHTHALMIC at 05:53

## 2017-07-04 RX ADMIN — SIMVASTATIN 20 MILLIGRAM(S): 20 TABLET, FILM COATED ORAL at 21:48

## 2017-07-04 NOTE — PROGRESS NOTE ADULT - PROBLEM SELECTOR PLAN 6
Lovenox    Dispo: rehab placement, waiting for authorization   -PT consult ordered, will follow recommendations   -F/u  regarding home services or rehab placement

## 2017-07-04 NOTE — PROGRESS NOTE ADULT - SUBJECTIVE AND OBJECTIVE BOX
Patient is a 82y old  Male who presents with a chief complaint of Blurry vision (02 Jul 2017 13:22)        SUBJECTIVE / OVERNIGHT EVENTS: Patient had no acute events overnight. Patient still complaining of blurry vision.       MEDICATIONS  (STANDING):  insulin lispro (HumaLOG) corrective regimen sliding scale   SubCutaneous three times a day before meals  insulin lispro (HumaLOG) corrective regimen sliding scale   SubCutaneous at bedtime  dextrose 50% Injectable 25 Gram(s) IV Push once  simvastatin 20 milliGRAM(s) Oral at bedtime  doxazosin 2 milliGRAM(s) Oral at bedtime  latanoprost 0.005% Ophthalmic Solution 1 Drop(s) Both EYES at bedtime  brimonidine 0.2% Ophthalmic Solution 1 Drop(s) Both EYES two times a day  timolol 0.5% Solution 1 Drop(s) Both EYES two times a day  artificial tears (preservative free) Ophthalmic Solution 1 Drop(s) Both EYES four times a day  enoxaparin Injectable 40 milliGRAM(s) SubCutaneous every 24 hours  insulin glargine Injectable (LANTUS) 10 Unit(s) SubCutaneous at bedtime  senna 2 Tablet(s) Oral at bedtime  docusate sodium 100 milliGRAM(s) Oral daily  insulin lispro Injectable (HumaLOG) 3 Unit(s) SubCutaneous three times a day before meals    MEDICATIONS  (PRN):  acetaminophen   Tablet. 325 milliGRAM(s) Oral every 6 hours PRN Moderate Pain (4 - 6)      Vital Signs Last 24 Hrs  T(C): 36.6 (07-04-17 @ 05:57), Max: 36.8 (07-03-17 @ 15:08)  HR: 60 (07-04-17 @ 05:57) (55 - 60)  BP: 122/66 (07-04-17 @ 05:57) (118/59 - 129/69)  RR: 18 (07-04-17 @ 05:57) (18 - 18)  SpO2: 100% (07-04-17 @ 05:57) (99% - 100%)  CAPILLARY BLOOD GLUCOSE  133 (04 Jul 2017 08:19)  202 (03 Jul 2017 22:14)  138 (03 Jul 2017 17:38)  331 (03 Jul 2017 12:21)  177 (03 Jul 2017 09:06)        I&O's Summary    03 Jul 2017 07:01  -  04 Jul 2017 07:00  --------------------------------------------------------  IN: 0 mL / OUT: 600 mL / NET: -600 mL        Physical Exam:  General: WN/WD NAD  HEENT:  Markedly diminished visual acuity B/L, PERRLA, EOMI, moist mucous membranes  Neurology: A&Ox3, nonfocal, SALCIDO x 4  Respiratory: CTA B/L, normal respiratory effort, no wheezes, crackles, rales  CV: RRR, S1S2, no murmurs, rubs or gallops  Abdominal: Soft, NT, ND +BS, Last BM  Extremities: No edema, + peripheral pulses  Skin: no petechia, rashes seen      LABS: No labs today

## 2017-07-05 VITALS
RESPIRATION RATE: 18 BRPM | DIASTOLIC BLOOD PRESSURE: 66 MMHG | OXYGEN SATURATION: 97 % | HEART RATE: 69 BPM | SYSTOLIC BLOOD PRESSURE: 125 MMHG | TEMPERATURE: 98 F

## 2017-07-05 PROCEDURE — 99239 HOSP IP/OBS DSCHRG MGMT >30: CPT

## 2017-07-05 RX ORDER — LISINOPRIL 2.5 MG/1
1 TABLET ORAL
Qty: 0 | Refills: 0 | COMMUNITY

## 2017-07-05 RX ADMIN — Medication 100 MILLIGRAM(S): at 12:49

## 2017-07-05 RX ADMIN — Medication 1 DROP(S): at 05:47

## 2017-07-05 RX ADMIN — Medication 3 UNIT(S): at 09:16

## 2017-07-05 RX ADMIN — Medication 1 DROP(S): at 05:48

## 2017-07-05 RX ADMIN — Medication 3 UNIT(S): at 12:50

## 2017-07-05 RX ADMIN — Medication 1: at 09:16

## 2017-07-05 RX ADMIN — ENOXAPARIN SODIUM 40 MILLIGRAM(S): 100 INJECTION SUBCUTANEOUS at 12:49

## 2017-07-05 RX ADMIN — Medication 1 DROP(S): at 12:49

## 2017-07-05 RX ADMIN — BRIMONIDINE TARTRATE 1 DROP(S): 2 SOLUTION/ DROPS OPHTHALMIC at 05:47

## 2017-07-05 RX ADMIN — Medication 4: at 12:50

## 2017-07-05 NOTE — PROGRESS NOTE ADULT - PROBLEM SELECTOR PLAN 6
Lovenox    Dispo: rehab placement, waiting for authorization   -PT consult ordered, will follow recommendations   -F/u  regarding home services or rehab placement Lovenox    Dispo: rehab placement today to [facility]   -PT consult ordered, will follow recommendations   -F/u  regarding home services or rehab placement

## 2017-07-05 NOTE — PROGRESS NOTE ADULT - SUBJECTIVE AND OBJECTIVE BOX
Patient is a 82y old  Male who presents with a chief complaint of Blurry vision (02 Jul 2017 13:22)        SUBJECTIVE / OVERNIGHT EVENTS: Patient had no acute events overnight. Patient still complains of foggy vision, same as yesterday.       MEDICATIONS  (STANDING):  insulin lispro (HumaLOG) corrective regimen sliding scale   SubCutaneous three times a day before meals  insulin lispro (HumaLOG) corrective regimen sliding scale   SubCutaneous at bedtime  dextrose 50% Injectable 25 Gram(s) IV Push once  simvastatin 20 milliGRAM(s) Oral at bedtime  doxazosin 2 milliGRAM(s) Oral at bedtime  latanoprost 0.005% Ophthalmic Solution 1 Drop(s) Both EYES at bedtime  brimonidine 0.2% Ophthalmic Solution 1 Drop(s) Both EYES two times a day  timolol 0.5% Solution 1 Drop(s) Both EYES two times a day  artificial tears (preservative free) Ophthalmic Solution 1 Drop(s) Both EYES four times a day  enoxaparin Injectable 40 milliGRAM(s) SubCutaneous every 24 hours  insulin glargine Injectable (LANTUS) 10 Unit(s) SubCutaneous at bedtime  senna 2 Tablet(s) Oral at bedtime  docusate sodium 100 milliGRAM(s) Oral daily  insulin lispro Injectable (HumaLOG) 3 Unit(s) SubCutaneous three times a day before meals    MEDICATIONS  (PRN):  acetaminophen   Tablet. 325 milliGRAM(s) Oral every 6 hours PRN Moderate Pain (4 - 6)      Vital Signs Last 24 Hrs  T(C): 36.7 (07-05-17 @ 05:42), Max: 37.9 (07-04-17 @ 21:50)  HR: 72 (07-05-17 @ 05:42) (71 - 77)  BP: 102/61 (07-05-17 @ 05:42) (102/61 - 120/66)  RR: 18 (07-05-17 @ 05:42) (18 - 18)  SpO2: 98% (07-05-17 @ 05:42) (98% - 99%)  CAPILLARY BLOOD GLUCOSE  174 (05 Jul 2017 08:44)  230 (04 Jul 2017 22:33)  100 (04 Jul 2017 17:02)  280 (04 Jul 2017 12:37)        I&O's Summary      PHYSICAL EXAM  GENERAL: NAD, well-developed  HEAD:  Atraumatic, Normocephalic  EYES: EOMI, PERRLA, conjunctiva and sclera clear  NECK: Supple, No JVD  CHEST/LUNG: Clear to auscultation bilaterally; No wheeze  HEART: Regular rate and rhythm; No murmurs, rubs, or gallops  ABDOMEN: Soft, Nontender, Nondistended; Bowel sounds present  EXTREMITIES:  2+ Peripheral Pulses, No clubbing, cyanosis, or edema  PSYCH: AAOx3  SKIN: No rashes or lesions    LABS:                    RADIOLOGY & ADDITIONAL TESTS:    Imaging Personally Reviewed:  Consultant(s) Notes Reviewed:    Care Discussed with Consultants/Other Providers: Patient is a 82y old  Male who presents with a chief complaint of Blurry vision (02 Jul 2017 13:22)        SUBJECTIVE / OVERNIGHT EVENTS: Patient had no acute events overnight. Patient still complains of foggy vision, same as yesterday.       MEDICATIONS  (STANDING):  insulin lispro (HumaLOG) corrective regimen sliding scale   SubCutaneous three times a day before meals  insulin lispro (HumaLOG) corrective regimen sliding scale   SubCutaneous at bedtime  dextrose 50% Injectable 25 Gram(s) IV Push once  simvastatin 20 milliGRAM(s) Oral at bedtime  doxazosin 2 milliGRAM(s) Oral at bedtime  latanoprost 0.005% Ophthalmic Solution 1 Drop(s) Both EYES at bedtime  brimonidine 0.2% Ophthalmic Solution 1 Drop(s) Both EYES two times a day  timolol 0.5% Solution 1 Drop(s) Both EYES two times a day  artificial tears (preservative free) Ophthalmic Solution 1 Drop(s) Both EYES four times a day  enoxaparin Injectable 40 milliGRAM(s) SubCutaneous every 24 hours  insulin glargine Injectable (LANTUS) 10 Unit(s) SubCutaneous at bedtime  senna 2 Tablet(s) Oral at bedtime  docusate sodium 100 milliGRAM(s) Oral daily  insulin lispro Injectable (HumaLOG) 3 Unit(s) SubCutaneous three times a day before meals    MEDICATIONS  (PRN):  acetaminophen   Tablet. 325 milliGRAM(s) Oral every 6 hours PRN Moderate Pain (4 - 6)      Vital Signs Last 24 Hrs  T(C): 36.7 (07-05-17 @ 05:42), Max: 37.9 (07-04-17 @ 21:50)  HR: 72 (07-05-17 @ 05:42) (71 - 77)  BP: 102/61 (07-05-17 @ 05:42) (102/61 - 120/66)  RR: 18 (07-05-17 @ 05:42) (18 - 18)  SpO2: 98% (07-05-17 @ 05:42) (98% - 99%)  CAPILLARY BLOOD GLUCOSE  174 (05 Jul 2017 08:44)  230 (04 Jul 2017 22:33)  100 (04 Jul 2017 17:02)  280 (04 Jul 2017 12:37)        I&O's Summary    Physical Exam:  General: WN/WD NAD  HEENT:  Markedly diminished visual acuity B/L, PERRLA, EOMI, moist mucous membranes  Neurology: A&Ox3, nonfocal, SALCIDO x 4  Respiratory: CTA B/L, normal respiratory effort, no wheezes, crackles, rales  CV: RRR, S1S2, no murmurs, rubs or gallops  Abdominal: Soft, NT, ND +BS, Last BM  Extremities: No edema, + peripheral pulses  Skin: no petechia, rashes seen    LABS: no labs today

## 2017-07-05 NOTE — PROGRESS NOTE ADULT - ATTENDING COMMENTS
I personally saw and evaluated the patient at bedside. Patient reports no improvement in his vision. Awaiting insurance authorization for discharge to [facility] as recommended by PT. c/w eye drops for treatment of glaucoma.
Pending final recommendations from Ophthalmology. Will likely initiate combigan and latanoprost.
Pt consistently reports improvement after nighttime latanoprost, but none after morning combigan. Will continue to discuss with Ophthalmology and optimize glycemic control with lantus. PT recommending short-term rehab, pt in agreement. Time planning D/C 35 minutes.
Pt notes fluctuating visual acuity. Also reports right lower back and hip pain he attributes to positioning in bed. Continue current ophthalmologic regimen. Plan to help pt to get out of bed to chair daily. Tylenol prn pain. Plan GIGI next week.
Vision largely unchanged since admission. Pt participating in Physical Therapy. Continue current ophthalmologic regimen. Anticipate need for short-term rehab followed by home services with aide.
Vision not significantly improved. Unfortunately, the patient may not be able to expect return of vision. Will continue to discuss with Ophthalmology and continue eye drop regimen. Encourage out of bed to chair and ambulation (with assistance) in anticipation of need for subacute rehab.
Pt notes some improvement in the evenings after latanoprost ggt, but in general, vision still poor. Will need to present pt with options to arrange private home care to ensure safe discharge.
Vision stable. Admission to [facility] approved by insurance. Stable for DC to [facility] today. C/w eye drops for treatment of glaucoma.    DISCHARGE TIME: 40 mins
Pt reports worsening vision today despite initiation of glaucoma ggt. Unclear if decline in vision is as acute as initially stated, considering that ophthalmologic exam was comparable to prior. Will continue to discuss with ophthalmology. If pt needs home services for ADLs in light of worsening vision will investigate options.

## 2017-07-07 ENCOUNTER — OTHER (OUTPATIENT)
Age: 82
End: 2017-07-07

## 2017-07-25 ENCOUNTER — APPOINTMENT (OUTPATIENT)
Dept: OPHTHALMOLOGY | Facility: CLINIC | Age: 82
End: 2017-07-25

## 2017-07-31 ENCOUNTER — EMERGENCY (EMERGENCY)
Facility: HOSPITAL | Age: 82
LOS: 1 days | Discharge: HOME | End: 2017-07-31
Attending: EMERGENCY MEDICINE | Admitting: EMERGENCY MEDICINE
Payer: MEDICARE

## 2017-07-31 VITALS
SYSTOLIC BLOOD PRESSURE: 144 MMHG | RESPIRATION RATE: 18 BRPM | HEART RATE: 78 BPM | DIASTOLIC BLOOD PRESSURE: 64 MMHG | OXYGEN SATURATION: 100 %

## 2017-07-31 VITALS
SYSTOLIC BLOOD PRESSURE: 150 MMHG | OXYGEN SATURATION: 100 % | HEART RATE: 70 BPM | DIASTOLIC BLOOD PRESSURE: 83 MMHG | RESPIRATION RATE: 16 BRPM

## 2017-07-31 DIAGNOSIS — Z09 ENCOUNTER FOR FOLLOW-UP EXAMINATION AFTER COMPLETED TREATMENT FOR CONDITIONS OTHER THAN MALIGNANT NEOPLASM: Chronic | ICD-10-CM

## 2017-07-31 PROCEDURE — 99285 EMERGENCY DEPT VISIT HI MDM: CPT | Mod: GC

## 2017-07-31 NOTE — CONSULT NOTE ADULT - SUBJECTIVE AND OBJECTIVE BOX
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 denies change in strength/sensation/jaw claudication/HA/scalp tenderness  Fhx: negative    VA cc near  NLP OD, 20/200 OS PH 20/70-  P surgical OU, no APD  T 28, 22  EOM full OU  CVF limited cooperation    SLE  LLA flat OU  C/S w+Q OU, tube in place OU, no exposure  K PK intact, mild haziness OU, minimal chronic appearing edema OU, old appearing pigment on endothelium OS, 1 loose suture at 7 o'clock OS, no infiltrate  AC D+Q OU  I surgical OU  L PCIOL OU    DFE: hazy view OU  OD CD 0.95, MVP wnl  OS CD 0.75, MVP wnl

## 2017-07-31 NOTE — ED PROVIDER NOTE - CHIEF COMPLAINT
The patient is a 82y Male complaining of The patient is a 82y Male complaining of left eye pain and decreased visual acuity

## 2017-07-31 NOTE — CONSULT NOTE ADULT - ATTENDING COMMENTS
pt seen and examined with resident. agree with above  subacute visual loss OS in monocular patient. Recently seen for similar complaints at primary eye MD at [facility]. vision stable since then.  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  - preservative free artifical tears QID OU  needs outpatient f/up for further evaluation with [facility] within 1 week of discharge or can see our eye clinic at [address] [place] [address], [phone].  cont current care per primary team, neuro-imaging if concerned for CVA

## 2017-07-31 NOTE — ED ADULT TRIAGE NOTE - CHIEF COMPLAINT QUOTE
BIBEMS from [facility] for L eye pain. Pt states pain x 1 week that worsened today. Hx: glaucoma, HTN. States "its painful & foggy." Denies HA, dizziness, N/V. Aox3

## 2017-07-31 NOTE — ED ADULT NURSE NOTE - OBJECTIVE STATEMENT
Pt rec'd in 21, from [facility] of DM and glaucoma. Pt states that he has been having left eye pain and blurry vision since June 14, was seen in ED on 16th and 19th for same, admitted for 15 days, then sent to [facility] on July 5th. Pt states he was given eye drops during hospital visits that temporarily improved his vision, but now it's worse again. Pt states he was also told he had to see a glaucoma doctor, and he wasn't able to see him. Pt also states his blood sugar has been poorly controlled since his hospitalization, and he was given Insulin, which he was never on before, states the vision would get worse after insulin shots.  in room. Awaiting MD aguilar.

## 2017-07-31 NOTE — ED PROVIDER NOTE - PROGRESS NOTE DETAILS
Spoke with Kaushal at 6:05pm for consult, will come to see patient. Ophto c/s appreciated, will dc with ofloxacin for four days, ophto f/u. Will arrange transport back to [place].  Don Swift MD PGY-4

## 2017-07-31 NOTE — ED PROVIDER NOTE - EYES, MLM
Left eye: minimally reactive to light, not cloudy, not fixed. Full extraocular movement. No vision at all in right eye, minimal vision in left. Very poor visual acuity in left eye. Poor peripheral vision. Tonopen 25 in left eye. No abrasions noted on flouroscein staining of left eye.

## 2017-07-31 NOTE — ED ADULT NURSE NOTE - CHIEF COMPLAINT QUOTE
BIBEMS from [facility] for L eye pain. Pt states pain x 1 week that worsened today. Hx: glaucoma, HTN. States "its painful & foggy." Denies HA, dizziness, N/V. Aox3

## 2017-07-31 NOTE — ED PROVIDER NOTE - OBJECTIVE STATEMENT
Pt is a 82 yr old male with extensive PMHX including open angle glaucoma, b/l corneal transplants, HTN, BPH, DM2 who presents with CC of left eye pain and left vision blurriness/fogginess. Pt states that he has had decreased visual acuity in left eye over past week, as well as left eye pain. BIBEMS from [place], where he was admitted in early July for [facility] rehab after a 2 week hospital admission for similar visual issues. Pt states that his vision has been getting worse in left eye over last 1 week and states that he believes it is due to his insulin and/or high blood glucose levels. States that the eye pain is recent onset and new, never had prior. Of note, no vision in right eye, chronic.   Denies any headache, chest pain, SOB, dizziness, nausea, vomiting, fever, chills. Denies recent travel/illness/trauma.   No other acute neurological symptoms at this time.   Tonopen pressure of 25. No abrasions noted on flouroscein staining.

## 2017-07-31 NOTE — ED PROVIDER NOTE - ATTENDING CONTRIBUTION TO CARE
82m, pmhx dm, glaucoma, b/l corneal transplants. was in ED c/o left eye pain and blurry vision x 1 week. no other acute neuro symptoms. had similar blurry vision several weeks ago OS, came to ED, given drops by optho, admitted and was dc to [facility]. sent here due to left eye pain and blurry vision. pt states blurry vision is intermittent and he relates it to his insulin. his eye pain is new. no change in drops. saw optho at [facility] several days ago who made no changes in his mgmt. no f/c, h/a or trauma. exam, vs wnl, nad. hs and lungs normal, abdo benign, no focal neuro deficits. left eye perrl, not cloudy and no fixed, dilated pupil, full eom but very poor va, and likely chronic. will consult optho for any new mgmt. no conern for acute neuro abnormality.

## 2017-07-31 NOTE — ED PROVIDER NOTE - MEDICAL DECISION MAKING DETAILS
82 yr old male with extensive medical history including open angle glaucoma, b/l corneal transplants, HTN, BPH, DM2 presenting with decreased left visual acuity and left eye pain. Increased pressure in left eye, tonopen 25. Concerning for worsening glaucoma. Opthomology consulted and will follow up.  Renny Whiteside MD, PGY1

## 2017-07-31 NOTE — CONSULT NOTE ADULT - ASSESSMENT
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 VA stable, IOP not improved  1. Loose suture OS   - no infiltrate  - removed at slit lamp   - Ofloxacin QID x 3 days     2. Glaucoma  - IOP not improved  - start brimonidine BID ou     F/u with [facility] within 1 week of discharge, otherwise call Eye clinic at [address] [place] [address], [phone]

## 2017-08-03 NOTE — PHYSICAL THERAPY INITIAL EVALUATION ADULT - REHAB POTENTIAL, PT EVAL
good, to achieve stated therapy goals Secondary Intention Text (Leave Blank If You Do Not Want): The defect will heal with secondary intention.

## 2017-08-09 ENCOUNTER — EMERGENCY (EMERGENCY)
Facility: HOSPITAL | Age: 82
LOS: 1 days | Discharge: HOME | End: 2017-08-09
Attending: EMERGENCY MEDICINE | Admitting: EMERGENCY MEDICINE
Payer: MEDICARE

## 2017-08-09 VITALS
DIASTOLIC BLOOD PRESSURE: 68 MMHG | HEART RATE: 86 BPM | OXYGEN SATURATION: 96 % | RESPIRATION RATE: 17 BRPM | SYSTOLIC BLOOD PRESSURE: 134 MMHG

## 2017-08-09 VITALS
HEART RATE: 87 BPM | TEMPERATURE: 98 F | RESPIRATION RATE: 18 BRPM | OXYGEN SATURATION: 99 % | SYSTOLIC BLOOD PRESSURE: 121 MMHG | DIASTOLIC BLOOD PRESSURE: 65 MMHG

## 2017-08-09 DIAGNOSIS — Z09 ENCOUNTER FOR FOLLOW-UP EXAMINATION AFTER COMPLETED TREATMENT FOR CONDITIONS OTHER THAN MALIGNANT NEOPLASM: Chronic | ICD-10-CM

## 2017-08-09 PROCEDURE — 99284 EMERGENCY DEPT VISIT MOD MDM: CPT

## 2017-08-09 NOTE — ED PROVIDER NOTE - MEDICAL DECISION MAKING DETAILS
82 M open angle glaucoma, b/l corneal transplants, HTN, BPH, DM2, was seen one week ago by ophtho for FBS in Lt eye and worsening fogginess, presents from [facility] for worsening fogginess. Plan: discuss case with Ophthalmology to arrange followup for worsening Glaucoma

## 2017-08-09 NOTE — ED ADULT NURSE NOTE - OBJECTIVE STATEMENT
received pt A&Ox3 in no apparent distress at this time. pt c/o blurry vision x both eyes secondary to glaucoma. MD at bedside. vss. dispo pending

## 2017-08-09 NOTE — ED PROVIDER NOTE - OBJECTIVE STATEMENT
82 M open angle glaucoma, b/l corneal transplants, HTN, BPH, DM2, was seen one week ago by ophtho for FBS in Lt eye and worsening fogginess, presents from [facility] for worsening fogginess. Says he is using drops and no improvement. No longer has FBS. No pain in eye. No fevers/chills.

## 2017-08-09 NOTE — ED PROVIDER NOTE - ATTENDING CONTRIBUTION TO CARE
JAYLA Attending Note - Dr. Hoang  82 M open angle glaucoma, b/l corneal transplants, HTN, BPH, DM2, was seen one week ago by ophtho for FBS in Lt eye and worsening fogginess and chronic vision loss of right eye, presents from [place] for worsening fogginess.   PE: pt is alert and oriented,left eye has only vague shapes.  Pt can see the whiteness of my shirt but his shoes.  Fundi can not be visualized due to scarring and cloudiness of cornea ent normal, membranes are moist, neck supple. no lymphadenopathy or thyroid enlargement, No JVD.  Chest clear to P&A, Heart- reg rhythm without murmur, rubs or gallops, radial pulses equal bilaterally.  Abd is soft, non-tender, Bowel sounds are active. no mass or organomegaly. : No CVA tenderness. Neuro:  Pt alert and oriented x 3. Perrl    Distal neurosensory is intact. Motor function is 5/5 strength bilaterally.  No focal deficits. Extremities:  No edema.  Skin: warm and dry.  Imp Chronic Glaucoma Plan to talk to ophthalmology to arrange follow up.

## 2017-08-09 NOTE — ED PROVIDER NOTE - PROGRESS NOTE DETAILS
Jomar: spoke with ophtho resident, patient is well known to their service. saw patient last week, no need for change in medications, patient is to follow up outpatient, will reiterate to [facility] to call their clinic for them to come to [facility] in[facility]

## 2017-08-11 DIAGNOSIS — H54.8 LEGAL BLINDNESS, AS DEFINED IN USA: ICD-10-CM

## 2017-08-18 ENCOUNTER — APPOINTMENT (OUTPATIENT)
Dept: OPHTHALMOLOGY | Facility: CLINIC | Age: 82
End: 2017-08-18

## 2017-08-24 ENCOUNTER — APPOINTMENT (OUTPATIENT)
Dept: OPHTHALMOLOGY | Facility: CLINIC | Age: 82
End: 2017-08-24

## 2017-08-29 ENCOUNTER — APPOINTMENT (OUTPATIENT)
Dept: OPHTHALMOLOGY | Facility: CLINIC | Age: 82
End: 2017-08-29

## 2017-09-05 ENCOUNTER — APPOINTMENT (OUTPATIENT)
Dept: OPHTHALMOLOGY | Facility: CLINIC | Age: 82
End: 2017-09-05

## 2017-09-14 ENCOUNTER — APPOINTMENT (OUTPATIENT)
Dept: OPHTHALMOLOGY | Facility: CLINIC | Age: 82
End: 2017-09-14
Payer: MEDICARE

## 2017-09-14 PROCEDURE — 92012 INTRM OPH EXAM EST PATIENT: CPT

## 2017-10-03 ENCOUNTER — APPOINTMENT (OUTPATIENT)
Dept: OPHTHALMOLOGY | Facility: CLINIC | Age: 82
End: 2017-10-03
Payer: MEDICARE

## 2017-10-03 PROCEDURE — 92012 INTRM OPH EXAM EST PATIENT: CPT

## 2017-11-07 ENCOUNTER — APPOINTMENT (OUTPATIENT)
Dept: OPHTHALMOLOGY | Facility: CLINIC | Age: 82
End: 2017-11-07
Payer: MEDICARE

## 2017-11-07 PROCEDURE — 92286 ANT SGM IMG I&R SPECLR MIC: CPT

## 2017-11-07 PROCEDURE — 92012 INTRM OPH EXAM EST PATIENT: CPT

## 2017-11-28 ENCOUNTER — APPOINTMENT (OUTPATIENT)
Dept: OPHTHALMOLOGY | Facility: CLINIC | Age: 82
End: 2017-11-28

## 2017-12-05 ENCOUNTER — APPOINTMENT (OUTPATIENT)
Dept: OPHTHALMOLOGY | Facility: CLINIC | Age: 82
End: 2017-12-05
Payer: MEDICARE

## 2017-12-05 DIAGNOSIS — H40.9 UNSPECIFIED GLAUCOMA: ICD-10-CM

## 2017-12-05 PROCEDURE — 92012 INTRM OPH EXAM EST PATIENT: CPT

## 2018-01-09 ENCOUNTER — APPOINTMENT (OUTPATIENT)
Dept: OPHTHALMOLOGY | Facility: CLINIC | Age: 83
End: 2018-01-09
Payer: MEDICARE

## 2018-01-09 DIAGNOSIS — T86.840 CORNEAL TRANSPLANT REJECTION: ICD-10-CM

## 2018-01-09 PROCEDURE — 92012 INTRM OPH EXAM EST PATIENT: CPT

## 2018-06-10 ENCOUNTER — EMERGENCY (EMERGENCY)
Facility: HOSPITAL | Age: 83
LOS: 1 days | Discharge: HOME | End: 2018-06-10
Attending: EMERGENCY MEDICINE | Admitting: EMERGENCY MEDICINE
Payer: MEDICARE

## 2018-06-10 VITALS
TEMPERATURE: 98 F | OXYGEN SATURATION: 100 % | SYSTOLIC BLOOD PRESSURE: 133 MMHG | DIASTOLIC BLOOD PRESSURE: 75 MMHG | HEART RATE: 83 BPM | RESPIRATION RATE: 16 BRPM

## 2018-06-10 VITALS
OXYGEN SATURATION: 98 % | RESPIRATION RATE: 16 BRPM | HEART RATE: 68 BPM | SYSTOLIC BLOOD PRESSURE: 137 MMHG | DIASTOLIC BLOOD PRESSURE: 64 MMHG

## 2018-06-10 DIAGNOSIS — Z09 ENCOUNTER FOR FOLLOW-UP EXAMINATION AFTER COMPLETED TREATMENT FOR CONDITIONS OTHER THAN MALIGNANT NEOPLASM: Chronic | ICD-10-CM

## 2018-06-10 LAB
ALBUMIN SERPL ELPH-MCNC: 3.8 G/DL — SIGNIFICANT CHANGE UP (ref 3.3–5)
ALP SERPL-CCNC: 59 U/L — SIGNIFICANT CHANGE UP (ref 40–120)
ALT FLD-CCNC: 10 U/L — SIGNIFICANT CHANGE UP (ref 4–41)
AST SERPL-CCNC: 18 U/L — SIGNIFICANT CHANGE UP (ref 4–40)
B PERT DNA SPEC QL NAA+PROBE: SIGNIFICANT CHANGE UP
BASE EXCESS BLDV CALC-SCNC: 5.9 MMOL/L — SIGNIFICANT CHANGE UP
BASOPHILS # BLD AUTO: 0.04 K/UL — SIGNIFICANT CHANGE UP (ref 0–0.2)
BASOPHILS NFR BLD AUTO: 0.5 % — SIGNIFICANT CHANGE UP (ref 0–2)
BILIRUB SERPL-MCNC: 0.3 MG/DL — SIGNIFICANT CHANGE UP (ref 0.2–1.2)
BLOOD GAS VENOUS - CREATININE: 0.73 MG/DL — SIGNIFICANT CHANGE UP (ref 0.5–1.3)
BUN SERPL-MCNC: 7 MG/DL — SIGNIFICANT CHANGE UP (ref 7–23)
C PNEUM DNA SPEC QL NAA+PROBE: NOT DETECTED — SIGNIFICANT CHANGE UP
CALCIUM SERPL-MCNC: 9.5 MG/DL — SIGNIFICANT CHANGE UP (ref 8.4–10.5)
CHLORIDE BLDV-SCNC: 105 MMOL/L — SIGNIFICANT CHANGE UP (ref 96–108)
CHLORIDE SERPL-SCNC: 99 MMOL/L — SIGNIFICANT CHANGE UP (ref 98–107)
CO2 SERPL-SCNC: 26 MMOL/L — SIGNIFICANT CHANGE UP (ref 22–31)
CREAT SERPL-MCNC: 0.72 MG/DL — SIGNIFICANT CHANGE UP (ref 0.5–1.3)
EOSINOPHIL # BLD AUTO: 0.12 K/UL — SIGNIFICANT CHANGE UP (ref 0–0.5)
EOSINOPHIL NFR BLD AUTO: 1.6 % — SIGNIFICANT CHANGE UP (ref 0–6)
FLUAV H1 2009 PAND RNA SPEC QL NAA+PROBE: NOT DETECTED — SIGNIFICANT CHANGE UP
FLUAV H1 RNA SPEC QL NAA+PROBE: NOT DETECTED — SIGNIFICANT CHANGE UP
FLUAV H3 RNA SPEC QL NAA+PROBE: NOT DETECTED — SIGNIFICANT CHANGE UP
FLUAV SUBTYP SPEC NAA+PROBE: SIGNIFICANT CHANGE UP
FLUBV RNA SPEC QL NAA+PROBE: NOT DETECTED — SIGNIFICANT CHANGE UP
GAS PNL BLDV: 135 MMOL/L — LOW (ref 136–146)
GLUCOSE BLDV-MCNC: 155 — HIGH (ref 70–99)
GLUCOSE SERPL-MCNC: 157 MG/DL — HIGH (ref 70–99)
HADV DNA SPEC QL NAA+PROBE: NOT DETECTED — SIGNIFICANT CHANGE UP
HCO3 BLDV-SCNC: 28 MMOL/L — HIGH (ref 20–27)
HCOV 229E RNA SPEC QL NAA+PROBE: NOT DETECTED — SIGNIFICANT CHANGE UP
HCOV HKU1 RNA SPEC QL NAA+PROBE: NOT DETECTED — SIGNIFICANT CHANGE UP
HCOV NL63 RNA SPEC QL NAA+PROBE: NOT DETECTED — SIGNIFICANT CHANGE UP
HCOV OC43 RNA SPEC QL NAA+PROBE: NOT DETECTED — SIGNIFICANT CHANGE UP
HCT VFR BLD CALC: 34.6 % — LOW (ref 39–50)
HCT VFR BLDV CALC: 38.1 % — LOW (ref 39–51)
HGB BLD-MCNC: 11.6 G/DL — LOW (ref 13–17)
HGB BLDV-MCNC: 12.4 G/DL — LOW (ref 13–17)
HMPV RNA SPEC QL NAA+PROBE: NOT DETECTED — SIGNIFICANT CHANGE UP
HPIV1 RNA SPEC QL NAA+PROBE: NOT DETECTED — SIGNIFICANT CHANGE UP
HPIV2 RNA SPEC QL NAA+PROBE: NOT DETECTED — SIGNIFICANT CHANGE UP
HPIV3 RNA SPEC QL NAA+PROBE: NOT DETECTED — SIGNIFICANT CHANGE UP
HPIV4 RNA SPEC QL NAA+PROBE: NOT DETECTED — SIGNIFICANT CHANGE UP
IMM GRANULOCYTES # BLD AUTO: 0.02 # — SIGNIFICANT CHANGE UP
IMM GRANULOCYTES NFR BLD AUTO: 0.3 % — SIGNIFICANT CHANGE UP (ref 0–1.5)
LACTATE BLDV-MCNC: 1 MMOL/L — SIGNIFICANT CHANGE UP (ref 0.5–2)
LYMPHOCYTES # BLD AUTO: 1.11 K/UL — SIGNIFICANT CHANGE UP (ref 1–3.3)
LYMPHOCYTES # BLD AUTO: 15.1 % — SIGNIFICANT CHANGE UP (ref 13–44)
M PNEUMO DNA SPEC QL NAA+PROBE: NOT DETECTED — SIGNIFICANT CHANGE UP
MCHC RBC-ENTMCNC: 32.9 PG — SIGNIFICANT CHANGE UP (ref 27–34)
MCHC RBC-ENTMCNC: 33.5 % — SIGNIFICANT CHANGE UP (ref 32–36)
MCV RBC AUTO: 98 FL — SIGNIFICANT CHANGE UP (ref 80–100)
MONOCYTES # BLD AUTO: 1.03 K/UL — HIGH (ref 0–0.9)
MONOCYTES NFR BLD AUTO: 14 % — SIGNIFICANT CHANGE UP (ref 2–14)
NEUTROPHILS # BLD AUTO: 5.03 K/UL — SIGNIFICANT CHANGE UP (ref 1.8–7.4)
NEUTROPHILS NFR BLD AUTO: 68.5 % — SIGNIFICANT CHANGE UP (ref 43–77)
NRBC # FLD: 0 — SIGNIFICANT CHANGE UP
PCO2 BLDV: 49 MMHG — SIGNIFICANT CHANGE UP (ref 41–51)
PH BLDV: 7.41 PH — SIGNIFICANT CHANGE UP (ref 7.32–7.43)
PLATELET # BLD AUTO: 295 K/UL — SIGNIFICANT CHANGE UP (ref 150–400)
PMV BLD: 9.4 FL — SIGNIFICANT CHANGE UP (ref 7–13)
PO2 BLDV: 26 MMHG — LOW (ref 35–40)
POTASSIUM BLDV-SCNC: 4.5 MMOL/L — SIGNIFICANT CHANGE UP (ref 3.4–4.5)
POTASSIUM SERPL-MCNC: 4.3 MMOL/L — SIGNIFICANT CHANGE UP (ref 3.5–5.3)
POTASSIUM SERPL-SCNC: 4.3 MMOL/L — SIGNIFICANT CHANGE UP (ref 3.5–5.3)
PROT SERPL-MCNC: 7.8 G/DL — SIGNIFICANT CHANGE UP (ref 6–8.3)
RBC # BLD: 3.53 M/UL — LOW (ref 4.2–5.8)
RBC # FLD: 11.9 % — SIGNIFICANT CHANGE UP (ref 10.3–14.5)
RSV RNA SPEC QL NAA+PROBE: NOT DETECTED — SIGNIFICANT CHANGE UP
RV+EV RNA SPEC QL NAA+PROBE: NOT DETECTED — SIGNIFICANT CHANGE UP
SAO2 % BLDV: 42.4 % — LOW (ref 60–85)
SODIUM SERPL-SCNC: 137 MMOL/L — SIGNIFICANT CHANGE UP (ref 135–145)
WBC # BLD: 7.35 K/UL — SIGNIFICANT CHANGE UP (ref 3.8–10.5)
WBC # FLD AUTO: 7.35 K/UL — SIGNIFICANT CHANGE UP (ref 3.8–10.5)

## 2018-06-10 PROCEDURE — 99284 EMERGENCY DEPT VISIT MOD MDM: CPT | Mod: 25,GC

## 2018-06-10 PROCEDURE — 71046 X-RAY EXAM CHEST 2 VIEWS: CPT | Mod: 26

## 2018-06-10 NOTE — PROVIDER CONTACT NOTE (OTHER) - ASSESSMENT
Medical team referred this case as pt needs a ride to his facility - [facility]. Case was discussed with the medical team informed that pt needs ambulance due to pt's medical conditions. Non Emergent ambulance form has been signed by MD and has been attached to the pt's envelope for EMS. Writer contacted [facility] [phone] spoke with the MsNaseem Lazar requested to call [facility] [phone] was contacted to [employer] [facility]  then spoke with  Ms. Segovia and was provided with trip confirmation # [id number] and then Ms. Segovia contacted [facility] and arranged  12 pm by [facility] trip # 206A as per Kassie at [facility].  Writer contacted [facility] -[phone] - spoke with nursing supervisor Ms. Sheridan informed her about pt's return. [facility] - address - [address] [place] [address] – room # [address].

## 2018-06-10 NOTE — ED ADULT NURSE NOTE - PATIENT DISCHARGE SIGNATURE
Partially impaired: cannot see medication labels or newsprint, but can see obstacles in path, and the surrounding layout; can count fingers at arm's length 10-Rob-2018

## 2018-06-10 NOTE — ED PROVIDER NOTE - OBJECTIVE STATEMENT
Pt is a 83 Y M with hx of DM coming from [facility] for cough since monday. Pt says that monday night he began having a sore throat, the next morning it was a little worse and then on Tuesday evening he developed a cough. He says that he has felt febrile but hasn't had his temperature measured. He says that the cough has not gone away since monday and then yesterday he had some mild streaks of blood because he had been coughing so much. He admits to SOB, some decreased ability to walk around 2/2 increasing SOB, productive cough. He denies chest pain, LOC, weakness, dysuria, nasal discharge. Pt has bilateral opacities in his eyes notes that he is awaiting corneal transplant.

## 2018-06-10 NOTE — ED PROVIDER NOTE - ATTENDING CONTRIBUTION TO CARE
DR. GRAYSON, ATTENDING MD-  I performed a face to face bedside interview with patient regarding history of present illness, review of symptoms and past medical history. I completed an independent physical exam.  I have discussed patient's plan of care with the resident.   Documentation as above in the note.   HPI: 83 Y M with hx of DM coming from [facility] for cough since monday. Pt says that monday night he began having a sore throat and cough, subj fevers. He says that the cough has not gone away since monday and then yesterday he had some mild streaks of blood this morning with cough. He admits to SOB, some decreased ability to walk around 2/2 increasing SOB, productive cough. He denies chest pain, LOC, weakness, dysuria, nasal discharge. Pt has bilateral opacities in his eyes notes that he is awaiting corneal transplant on Thursday.  EXAM: Well appearing, NAD, bilateral glaucoma, lungs ctab. abd soft nontender, no BLE edema.   MDM: Concern for URI vs PNA. Streaks blood most likely from cough, unlikely josé miguel kamara vs cancer or other concerning pathology. Pt well appearing, afebrile here, VSS. Was apparently recently cleared for surgery that is scheduled for 4 days from today. Will obtain labs, imaging, and reassess. Pt no coughing now.

## 2018-06-10 NOTE — ED ADULT NURSE NOTE - CHIEF COMPLAINT QUOTE
Pt brought in from [facility] for cough to r/o PNA. According to pt cough started Monday night worsening tonight.

## 2018-06-10 NOTE — ED PROVIDER NOTE - MEDICAL DECISION MAKING DETAILS
83 Y M with hx of DM with sore throat followed by cough since monday not improving. DDx: URI, PNA, bronchitis. Will get CXR, labs, likely dispo back to [facility] +/- [facility]hi.

## 2018-06-10 NOTE — ED ADULT TRIAGE NOTE - CHIEF COMPLAINT QUOTE
Pt brought in from [facility] for cough to r/o PNA. According to pt cough started Monday night worsening tonight.

## 2019-01-24 NOTE — ED ADULT NURSE NOTE - TEMPLATE LIST FOR HEAD TO TOE ASSESSMENT
Blood pressure with good control. Will change to ARB, can use Telmisartan 40mg. It may help alleviate his cough.
Has been well controlled.
I will change Ramipril to a an ARB as it may be causing his cough. If not it may be from an allergy or even GERD.
Pt has ED and saw Dr. Priscilla Thompson. He is on Cialis but it is not effective. This could be a medication effect from Metoprolol. He can put it on hold for 2 weeks and see if that has any effect. Ultimately he should be on a Betablocker.
Pt has been following with pain management. Has had 2 epidural injections.
Will check blood work.
General

## 2019-05-14 ENCOUNTER — APPOINTMENT (OUTPATIENT)
Dept: OPHTHALMOLOGY | Facility: CLINIC | Age: 84
End: 2019-05-14

## 2019-06-14 NOTE — ED PROVIDER NOTE - CHPI ED SYMPTOMS NEG
Addended by: Wilmar Raymond on: 6/14/2019 02:17 PM     Modules accepted: Orders no double vision/no foreign body/no eye lid swelling/no purulent drainage/no drainage/no pain/no itching/no discharge

## 2019-09-17 ENCOUNTER — APPOINTMENT (OUTPATIENT)
Dept: CT IMAGING | Facility: HOSPITAL | Age: 84
End: 2019-09-17
Payer: MEDICARE

## 2019-09-17 ENCOUNTER — OUTPATIENT (OUTPATIENT)
Dept: OUTPATIENT SERVICES | Facility: HOSPITAL | Age: 84
LOS: 1 days | End: 2019-09-17

## 2019-09-17 DIAGNOSIS — Z09 ENCOUNTER FOR FOLLOW-UP EXAMINATION AFTER COMPLETED TREATMENT FOR CONDITIONS OTHER THAN MALIGNANT NEOPLASM: Chronic | ICD-10-CM

## 2019-09-17 DIAGNOSIS — S10.95XA SUPERFICIAL FOREIGN BODY OF UNSPECIFIED PART OF NECK, INITIAL ENCOUNTER: ICD-10-CM

## 2019-09-17 PROCEDURE — 70490 CT SOFT TISSUE NECK W/O DYE: CPT | Mod: 26

## 2019-11-09 ENCOUNTER — EMERGENCY (EMERGENCY)
Facility: HOSPITAL | Age: 84
LOS: 1 days | Discharge: HOME | End: 2019-11-09
Attending: EMERGENCY MEDICINE | Admitting: EMERGENCY MEDICINE
Payer: COMMERCIAL

## 2019-11-09 VITALS
OXYGEN SATURATION: 100 % | RESPIRATION RATE: 16 BRPM | SYSTOLIC BLOOD PRESSURE: 157 MMHG | DIASTOLIC BLOOD PRESSURE: 80 MMHG | TEMPERATURE: 98 F | HEART RATE: 82 BPM

## 2019-11-09 VITALS
DIASTOLIC BLOOD PRESSURE: 77 MMHG | HEART RATE: 66 BPM | SYSTOLIC BLOOD PRESSURE: 156 MMHG | OXYGEN SATURATION: 100 % | TEMPERATURE: 98 F | RESPIRATION RATE: 18 BRPM

## 2019-11-09 DIAGNOSIS — Z09 ENCOUNTER FOR FOLLOW-UP EXAMINATION AFTER COMPLETED TREATMENT FOR CONDITIONS OTHER THAN MALIGNANT NEOPLASM: Chronic | ICD-10-CM

## 2019-11-09 LAB
ALBUMIN SERPL ELPH-MCNC: 4.3 G/DL — SIGNIFICANT CHANGE UP (ref 3.3–5)
ALP SERPL-CCNC: 55 U/L — SIGNIFICANT CHANGE UP (ref 40–120)
ALT FLD-CCNC: 14 U/L — SIGNIFICANT CHANGE UP (ref 4–41)
ANION GAP SERPL CALC-SCNC: 12 MMO/L — SIGNIFICANT CHANGE UP (ref 7–14)
APPEARANCE UR: CLEAR — SIGNIFICANT CHANGE UP
AST SERPL-CCNC: 13 U/L — SIGNIFICANT CHANGE UP (ref 4–40)
BASOPHILS # BLD AUTO: 0.03 K/UL — SIGNIFICANT CHANGE UP (ref 0–0.2)
BASOPHILS NFR BLD AUTO: 0.5 % — SIGNIFICANT CHANGE UP (ref 0–2)
BILIRUB SERPL-MCNC: < 0.2 MG/DL — LOW (ref 0.2–1.2)
BILIRUB UR-MCNC: NEGATIVE — SIGNIFICANT CHANGE UP
BLOOD UR QL VISUAL: NEGATIVE — SIGNIFICANT CHANGE UP
BUN SERPL-MCNC: 10 MG/DL — SIGNIFICANT CHANGE UP (ref 7–23)
CALCIUM SERPL-MCNC: 9.5 MG/DL — SIGNIFICANT CHANGE UP (ref 8.4–10.5)
CHLORIDE SERPL-SCNC: 94 MMOL/L — LOW (ref 98–107)
CO2 SERPL-SCNC: 25 MMOL/L — SIGNIFICANT CHANGE UP (ref 22–31)
COLOR SPEC: SIGNIFICANT CHANGE UP
CREAT SERPL-MCNC: 0.68 MG/DL — SIGNIFICANT CHANGE UP (ref 0.5–1.3)
EOSINOPHIL # BLD AUTO: 0.11 K/UL — SIGNIFICANT CHANGE UP (ref 0–0.5)
EOSINOPHIL NFR BLD AUTO: 1.8 % — SIGNIFICANT CHANGE UP (ref 0–6)
GLUCOSE SERPL-MCNC: 195 MG/DL — HIGH (ref 70–99)
GLUCOSE UR-MCNC: NEGATIVE — SIGNIFICANT CHANGE UP
HCT VFR BLD CALC: 37.8 % — LOW (ref 39–50)
HGB BLD-MCNC: 12.7 G/DL — LOW (ref 13–17)
IMM GRANULOCYTES NFR BLD AUTO: 0.3 % — SIGNIFICANT CHANGE UP (ref 0–1.5)
KETONES UR-MCNC: NEGATIVE — SIGNIFICANT CHANGE UP
LEUKOCYTE ESTERASE UR-ACNC: NEGATIVE — SIGNIFICANT CHANGE UP
LYMPHOCYTES # BLD AUTO: 0.97 K/UL — LOW (ref 1–3.3)
LYMPHOCYTES # BLD AUTO: 15.5 % — SIGNIFICANT CHANGE UP (ref 13–44)
MCHC RBC-ENTMCNC: 32.9 PG — SIGNIFICANT CHANGE UP (ref 27–34)
MCHC RBC-ENTMCNC: 33.6 % — SIGNIFICANT CHANGE UP (ref 32–36)
MCV RBC AUTO: 97.9 FL — SIGNIFICANT CHANGE UP (ref 80–100)
MONOCYTES # BLD AUTO: 0.67 K/UL — SIGNIFICANT CHANGE UP (ref 0–0.9)
MONOCYTES NFR BLD AUTO: 10.7 % — SIGNIFICANT CHANGE UP (ref 2–14)
NEUTROPHILS # BLD AUTO: 4.45 K/UL — SIGNIFICANT CHANGE UP (ref 1.8–7.4)
NEUTROPHILS NFR BLD AUTO: 71.2 % — SIGNIFICANT CHANGE UP (ref 43–77)
NITRITE UR-MCNC: NEGATIVE — SIGNIFICANT CHANGE UP
NRBC # FLD: 0 K/UL — SIGNIFICANT CHANGE UP (ref 0–0)
PH UR: 6.5 — SIGNIFICANT CHANGE UP (ref 5–8)
PLATELET # BLD AUTO: 266 K/UL — SIGNIFICANT CHANGE UP (ref 150–400)
PMV BLD: 9.5 FL — SIGNIFICANT CHANGE UP (ref 7–13)
POTASSIUM SERPL-MCNC: 4.2 MMOL/L — SIGNIFICANT CHANGE UP (ref 3.5–5.3)
POTASSIUM SERPL-SCNC: 4.2 MMOL/L — SIGNIFICANT CHANGE UP (ref 3.5–5.3)
PROT SERPL-MCNC: 7.6 G/DL — SIGNIFICANT CHANGE UP (ref 6–8.3)
PROT UR-MCNC: 10 — SIGNIFICANT CHANGE UP
RBC # BLD: 3.86 M/UL — LOW (ref 4.2–5.8)
RBC # FLD: 11.9 % — SIGNIFICANT CHANGE UP (ref 10.3–14.5)
SODIUM SERPL-SCNC: 131 MMOL/L — LOW (ref 135–145)
SP GR SPEC: 1.01 — SIGNIFICANT CHANGE UP (ref 1–1.04)
UROBILINOGEN FLD QL: NORMAL — SIGNIFICANT CHANGE UP
WBC # BLD: 6.25 K/UL — SIGNIFICANT CHANGE UP (ref 3.8–10.5)
WBC # FLD AUTO: 6.25 K/UL — SIGNIFICANT CHANGE UP (ref 3.8–10.5)

## 2019-11-09 PROCEDURE — 73562 X-RAY EXAM OF KNEE 3: CPT | Mod: 26,RT

## 2019-11-09 PROCEDURE — 70450 CT HEAD/BRAIN W/O DYE: CPT | Mod: 26

## 2019-11-09 PROCEDURE — 99284 EMERGENCY DEPT VISIT MOD MDM: CPT

## 2019-11-09 RX ORDER — AMLODIPINE BESYLATE 2.5 MG/1
5 TABLET ORAL ONCE
Refills: 0 | Status: COMPLETED | OUTPATIENT
Start: 2019-11-09 | End: 2019-11-09

## 2019-11-09 RX ORDER — ACETAMINOPHEN 500 MG
650 TABLET ORAL ONCE
Refills: 0 | Status: COMPLETED | OUTPATIENT
Start: 2019-11-09 | End: 2019-11-09

## 2019-11-09 RX ADMIN — Medication 650 MILLIGRAM(S): at 08:15

## 2019-11-09 RX ADMIN — Medication 650 MILLIGRAM(S): at 03:28

## 2019-11-09 RX ADMIN — AMLODIPINE BESYLATE 5 MILLIGRAM(S): 2.5 TABLET ORAL at 10:37

## 2019-11-09 NOTE — ED PROVIDER NOTE - PHYSICAL EXAMINATION
Const: Well-nourished, Well-developed, appearing stated age.  Eyes: PERRL, no conjunctival injection, and symmetrical lids.  HEENT: Head NCAT, no lesions. Atraumatic external nose and ears. Moist MM.  Neck: Symmetric, trachea midline, No thyromegaly.  CVS: +S1/S2, Peripheral pulses 2+ and equal in all extremities.  RESP: Unlabored respiratory effort. Clear to auscultation bilaterally.  GI: Nontender/Nondistended, No hepatosplenomegaly.  MSK: Normocephalic/Atraumatic, Extremities w/o deformity, + TTP right knee. No cyanosis or clubbing  Skin: Warm, dry and intact. No rashes or lesions.  Neuro: CNs II-XII grossly intact. Motor & Sensation grossly intact. Finger nose intact, no nystagmus.   Psych: Awake, Alert, & Oriented (AAO) x3. Appropriate mood and affect.

## 2019-11-09 NOTE — ED ADULT NURSE NOTE - CHIEF COMPLAINT QUOTE
Patient sent from [facility] for elevated BP.  Per staff, SBP in 160's and complaining of lightheadedness and left side neck pain.  /80 in ambay.  Patient is unsure why he is at [facility].  Denies Headache, chest pain or SOB.  Appears comfortable and in no apparent distress.

## 2019-11-09 NOTE — ED PROVIDER NOTE - CLINICAL SUMMARY MEDICAL DECISION MAKING FREE TEXT BOX
84M pmhx htn hld dm presenting from [place] for HTN and dizziness. PE: Vitals stable, normal neuro exam. Ddx: Rule out stroke Plan: CT, labs, urine

## 2019-11-09 NOTE — ED PROVIDER NOTE - NS ED ROS FT
CONST: no fevers, no chills, no trauma  EYES: no pain, no visual disturbances  ENT: no sore throat, no epistaxis, no rhinorrhea, no hearing changes  CV: no chest pain, no palpitations, no orthopnea, no extremity pain or swelling  RESP: no shortness of breath, no cough, no sputum, no pleurisy, no wheezing  ABD: no abdominal pain, no nausea, no vomiting, no diarrhea, no black or bloody stool  : no dysuria, no hematuria, + frequency, no urgency  MSK: no back pain, no neck pain, +right knee pain   NEURO: no headache, no sensory disturbances, no focal weakness, + dizziness  HEME: no easy bleeding or bruising  SKIN: no diaphoresis, no rash

## 2019-11-09 NOTE — ED PROVIDER NOTE - NSFOLLOWUPINSTRUCTIONS_ED_ALL_ED_FT
Please see attached information about dizziness.     Please return to the ED immediately for new changes in speech, new trouble walking, numbness, loss of consciousness.

## 2019-11-09 NOTE — ED PROVIDER NOTE - OBJECTIVE STATEMENT
84M pmhx HTN, HLD presenting with CC of dizziness and high blood pressure, sent in from [facility]. Per report and patient was being wheeled in his wheelchair today and hit his knee on a wall/edge. At the time nurse took his BP and systolic was 180s. Was sent in for evaluation. Patient states that he has been urinating more frequently recently and occasionally gets dizzy when going from laying to sitting position. No fevers, chills, nausea, vomiting, numbness, tingling, focal weakness.

## 2019-11-09 NOTE — ED PROVIDER NOTE - ATTENDING CONTRIBUTION TO CARE
agree with resident note    "84M pmhx HTN, HLD presenting with CC of dizziness and high blood pressure, sent in from [facility]. Per report and patient was being wheeled in his wheelchair today and hit his knee on a wall/edge. At the time nurse took his BP and systolic was 180s. Was sent in for evaluation. "  Pt does relate hx of being dizzy described as vertiginous earlier this week.  Denies those complaints currently.    PE: well appearing; hypertensive; post surgical pupils; EOMI, no nystagmus; CTAB/L; s1 s2 no m/r/g abd soft/NT/ND ext: no edema Neuro: Cns intact 5/5 motor UE and LE; sensation intact; FTN wnl; no drift; ADAN wnl

## 2019-11-09 NOTE — CHART NOTE - NSCHARTNOTEFT_GEN_A_CORE
SW contacted by ROSEMARY Mac who states Pt is cleared for D/C and is able to return to previous residence- [facility]. SW met with Pt confirmed residence.  SW contacted [facility] [phone] spoke with unit RN Patti Victor, alerted of Pt's return to unit 438A and confirmed Pt's mode of transport is ambulette.  Clinical provider in agreement with ambulette travel back to residence.  SW contacted [facility] [phone] spoke with staff Marnie who is unable to locate Pt in their system, Pt denied.  SW contacted [facility] [phone] spoke with staff Laurence who states Pt is managed by [facility] and is not eligible at this time with [facility].  SW reviewed transfer documents. SW contacted [facility] again spoke with staff Harriet explained issue and mentioned that on paperwork Pt's last name has a hyphen, staff able to find Pt information in system. She provided invoice #[id number] and booked with [facility] ETA 12:30pm.  SW informed medical team and Pt, all in agreement with plan.  No further SW intervention required at this time.

## 2019-11-09 NOTE — ED ADULT NURSE NOTE - NSIMPLEMENTINTERV_GEN_ALL_ED
Implemented All Fall Risk Interventions:  [place] to call system. Call bell, personal items and telephone within reach. Instruct patient to call for assistance. Room bathroom lighting operational. Non-slip footwear when patient is off stretcher. Physically safe environment: no spills, clutter or unnecessary equipment. Stretcher in lowest position, wheels locked, appropriate side rails in place. Provide visual cue, wrist band, yellow gown, etc. Monitor gait and stability. Monitor for mental status changes and reorient to person, place, and time. Review medications for side effects contributing to fall risk. Reinforce activity limits and safety measures with patient and family.

## 2019-11-09 NOTE — ED ADULT NURSE NOTE - OBJECTIVE STATEMENT
received to room 23. states sent in from [place]  for hypertension. denies any headache, dizziness, or other complaints. states has been having right knee pain s/p bumping into something while walking with wheelchair. was requesting tylenol at [facility] but did not receive any because was sent here for HTN. only has pain when walking. awaits md aguilar in [facility]. received to room 23. states sent in from [place]  for hypertension. denies any headache, dizziness. states has been feeling tired/weak. states also has been having right knee pain s/p bumping into something while walking with wheelchair. was requesting tylenol at [place] but did not receive any because was sent here for HTN. only has pain when walking. awaits md aguilar in [facility].

## 2019-11-09 NOTE — ED ADULT TRIAGE NOTE - CHIEF COMPLAINT QUOTE
Patient sent from [facility] for elevated BP.  Per staff, SBP in 160's and complaining of lightheadedness and left side neck pain.  /80 in ambay.  Patient is unsure why he is at [facility].  Denies Headache, chest pain or SOB.  Appears comfortable and in no apparent distress.

## 2019-12-05 ENCOUNTER — NON-APPOINTMENT (OUTPATIENT)
Age: 84
End: 2019-12-05

## 2019-12-05 ENCOUNTER — APPOINTMENT (OUTPATIENT)
Dept: OPHTHALMOLOGY | Facility: CLINIC | Age: 84
End: 2019-12-05
Payer: MEDICARE

## 2019-12-05 PROCEDURE — 92012 INTRM OPH EXAM EST PATIENT: CPT

## 2020-01-24 ENCOUNTER — EMERGENCY (EMERGENCY)
Facility: HOSPITAL | Age: 85
LOS: 1 days | Discharge: HOME | End: 2020-01-24
Attending: EMERGENCY MEDICINE | Admitting: EMERGENCY MEDICINE
Payer: COMMERCIAL

## 2020-01-24 VITALS
DIASTOLIC BLOOD PRESSURE: 63 MMHG | RESPIRATION RATE: 18 BRPM | SYSTOLIC BLOOD PRESSURE: 129 MMHG | TEMPERATURE: 99 F | OXYGEN SATURATION: 98 % | HEART RATE: 93 BPM

## 2020-01-24 VITALS — TEMPERATURE: 99 F

## 2020-01-24 DIAGNOSIS — Z09 ENCOUNTER FOR FOLLOW-UP EXAMINATION AFTER COMPLETED TREATMENT FOR CONDITIONS OTHER THAN MALIGNANT NEOPLASM: Chronic | ICD-10-CM

## 2020-01-24 LAB
ALBUMIN SERPL ELPH-MCNC: 3.9 G/DL — SIGNIFICANT CHANGE UP (ref 3.3–5)
ALP SERPL-CCNC: 53 U/L — SIGNIFICANT CHANGE UP (ref 40–120)
ALT FLD-CCNC: 22 U/L — SIGNIFICANT CHANGE UP (ref 4–41)
ANION GAP SERPL CALC-SCNC: 13 MMO/L — SIGNIFICANT CHANGE UP (ref 7–14)
AST SERPL-CCNC: 17 U/L — SIGNIFICANT CHANGE UP (ref 4–40)
BASOPHILS # BLD AUTO: 0.04 K/UL — SIGNIFICANT CHANGE UP (ref 0–0.2)
BASOPHILS NFR BLD AUTO: 0.8 % — SIGNIFICANT CHANGE UP (ref 0–2)
BILIRUB SERPL-MCNC: < 0.2 MG/DL — LOW (ref 0.2–1.2)
BUN SERPL-MCNC: 9 MG/DL — SIGNIFICANT CHANGE UP (ref 7–23)
CALCIUM SERPL-MCNC: 9.4 MG/DL — SIGNIFICANT CHANGE UP (ref 8.4–10.5)
CHLORIDE SERPL-SCNC: 98 MMOL/L — SIGNIFICANT CHANGE UP (ref 98–107)
CO2 SERPL-SCNC: 24 MMOL/L — SIGNIFICANT CHANGE UP (ref 22–31)
CREAT SERPL-MCNC: 0.78 MG/DL — SIGNIFICANT CHANGE UP (ref 0.5–1.3)
EOSINOPHIL # BLD AUTO: 0.13 K/UL — SIGNIFICANT CHANGE UP (ref 0–0.5)
EOSINOPHIL NFR BLD AUTO: 2.6 % — SIGNIFICANT CHANGE UP (ref 0–6)
GLUCOSE SERPL-MCNC: 282 MG/DL — HIGH (ref 70–99)
HCT VFR BLD CALC: 33.2 % — LOW (ref 39–50)
HGB BLD-MCNC: 11.2 G/DL — LOW (ref 13–17)
IMM GRANULOCYTES NFR BLD AUTO: 0.2 % — SIGNIFICANT CHANGE UP (ref 0–1.5)
LYMPHOCYTES # BLD AUTO: 0.65 K/UL — LOW (ref 1–3.3)
LYMPHOCYTES # BLD AUTO: 12.9 % — LOW (ref 13–44)
MCHC RBC-ENTMCNC: 33.2 PG — SIGNIFICANT CHANGE UP (ref 27–34)
MCHC RBC-ENTMCNC: 33.7 % — SIGNIFICANT CHANGE UP (ref 32–36)
MCV RBC AUTO: 98.5 FL — SIGNIFICANT CHANGE UP (ref 80–100)
MONOCYTES # BLD AUTO: 0.9 K/UL — SIGNIFICANT CHANGE UP (ref 0–0.9)
MONOCYTES NFR BLD AUTO: 17.8 % — HIGH (ref 2–14)
NEUTROPHILS # BLD AUTO: 3.32 K/UL — SIGNIFICANT CHANGE UP (ref 1.8–7.4)
NEUTROPHILS NFR BLD AUTO: 65.7 % — SIGNIFICANT CHANGE UP (ref 43–77)
NRBC # FLD: 0 K/UL — SIGNIFICANT CHANGE UP (ref 0–0)
PLATELET # BLD AUTO: 235 K/UL — SIGNIFICANT CHANGE UP (ref 150–400)
PMV BLD: 9.4 FL — SIGNIFICANT CHANGE UP (ref 7–13)
POTASSIUM SERPL-MCNC: 4.3 MMOL/L — SIGNIFICANT CHANGE UP (ref 3.5–5.3)
POTASSIUM SERPL-SCNC: 4.3 MMOL/L — SIGNIFICANT CHANGE UP (ref 3.5–5.3)
PROT SERPL-MCNC: 6.8 G/DL — SIGNIFICANT CHANGE UP (ref 6–8.3)
RBC # BLD: 3.37 M/UL — LOW (ref 4.2–5.8)
RBC # FLD: 11.9 % — SIGNIFICANT CHANGE UP (ref 10.3–14.5)
SODIUM SERPL-SCNC: 135 MMOL/L — SIGNIFICANT CHANGE UP (ref 135–145)
WBC # BLD: 5.05 K/UL — SIGNIFICANT CHANGE UP (ref 3.8–10.5)
WBC # FLD AUTO: 5.05 K/UL — SIGNIFICANT CHANGE UP (ref 3.8–10.5)

## 2020-01-24 PROCEDURE — 71046 X-RAY EXAM CHEST 2 VIEWS: CPT | Mod: 26

## 2020-01-24 PROCEDURE — 99283 EMERGENCY DEPT VISIT LOW MDM: CPT

## 2020-01-24 RX ORDER — BENZOCAINE AND MENTHOL 5; 1 G/100ML; G/100ML
1 LIQUID ORAL ONCE
Refills: 0 | Status: COMPLETED | OUTPATIENT
Start: 2020-01-24 | End: 2020-01-24

## 2020-01-24 RX ADMIN — BENZOCAINE AND MENTHOL 1 LOZENGE: 5; 1 LIQUID ORAL at 06:36

## 2020-01-24 RX ADMIN — Medication 100 MILLIGRAM(S): at 06:36

## 2020-01-24 NOTE — PROVIDER CONTACT NOTE (OTHER) - BACKGROUND
ED Nurse asked ED SW to assist with transportation back to [facility] [phone] today because pt. has been medically cleared.  SAROJ contacted Nurse Rosales on 4th floor at [facility]

## 2020-01-24 NOTE — ED ADULT NURSE NOTE - CHIEF COMPLAINT QUOTE
Pt arrives from [facility]; pt c/o persistent cough x 2 days. Pt states the RN at [facility] promised him Robitussin for the cough but he waited all day for it and only received the first dose at 5:30pm and another dose at 11pm. Cough persists and nurse said he would have to wait for a third dose so he called 911. Pt denies CP; states his back hurts but only when coughing.

## 2020-01-24 NOTE — ED ADULT NURSE NOTE - OBJECTIVE STATEMENT
pt received at intake rm 12 AAO x 3. pt reports dry cough for several days. pt also reports back pain and chest discomfort when coughing. pt states he called 911 bc he kept having to ask for cough medication to staff at [facility]. pt denies sob, chest pain, nausea, vomiting, fevers, chills, runny nose, congestion, respirations even and unlabored.

## 2020-01-24 NOTE — ED PROVIDER NOTE - OBJECTIVE STATEMENT
84 y/o M with h/o HTN, hyperlipidemia, DM from [facility] for cough.  Pt reports several days of dry cough.  Denies associated fever, sore throat, nasal congestion, sputum production, sob, leg pain or swelling.  He reports pain to back, chest and abdomen when he coughs, but not otherwise.  He states he called 911 tonight because he kept asking staff at [facility] for cough medications and they told him that they could not give it to him until 9am.  Pt states that he did not want to wait that long, so called 911 himself to be brought to the ED.

## 2020-01-24 NOTE — PROVIDER CONTACT NOTE (OTHER) - ASSESSMENT
and notified her of pt's return.  She said he is long term care at [facility].  Pt is able to travel by ambulette.  SW met with pt at bedside.  He is in agreement with discharge plan.  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 and notified her of pt's return.  She said he is long term care at [place].  Pt is able to travel by ambulette.  SW met with pt at bedside.  He is in agreement with discharge plan.  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  [facility] through [facility]  provided transportation to [place].  [facility] [place] has him with a hyphenated name.  No further SW intervention required at this time. and notified her of pt's return.  She said he is long term care at [place].  Pt is able to travel by ambulette.  SW met with pt at bedside.  He is in agreement with discharge plan.  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  [facility] through [employer]  provided transportation to [place].  [employer] has him with a hyphenated name.  No further SW intervention required at this time.

## 2020-01-24 NOTE — ED PROVIDER NOTE - NSFOLLOWUPINSTRUCTIONS_ED_ALL_ED_FT
Drink plenty of fluids.  You can take ibuprofen 600mg every 6 hours or Tylenol 650mg every 4 hours as needed for pain or fever.  You can also use cepacol lozenges or tessalon pearles 100mg up to three times a day for cough.  Follow-up with your PMD as needed.  Return to the emergency department for any new or worsening symptoms.

## 2020-01-24 NOTE — ED PROVIDER NOTE - PROGRESS NOTE DETAILS
Filippo PETERS: Pt resting comfortably, no complaints.  XR clear, labs wnl.  Will dc back to [facility], pending transportation.  I spoke with ROSEMARY Vizcaino at [place] who was made aware of patient's results and disposition.

## 2020-01-24 NOTE — ED PROVIDER NOTE - PATIENT PORTAL LINK FT
You can access the FollowMyHealth Patient Portal offered by [facility] by registering at the following website: [email or web address]. By joining [employer]’s FollowMyHealth portal, you will also be able to view your health information using other applications (apps) compatible with our system.

## 2020-01-24 NOTE — ED PROVIDER NOTE - CLINICAL SUMMARY MEDICAL DECISION MAKING FREE TEXT BOX
86 y/o M with h/o HTN, hyperlipidemia, DM here with dry cough.  Afebrile, no resp distress, will obtain labs, cxr, r/o pna, treat cough, likely viral, reassess.

## 2020-01-24 NOTE — ED ADULT TRIAGE NOTE - CHIEF COMPLAINT QUOTE
Pt arrives from [facility]; pt c/o persistent cough x 2 days. Pt states the RN at [facility] promised him Robitussin for the cough but he waited all day for it and only received the first dose at 5:30pm and another dose at 11pm. Cough persists and nurse said he would have to wait for a third dose so he called 911. Pt denies CP; states his back hurts but only when coughing.

## 2020-02-10 NOTE — ED PROVIDER NOTE - NSTIMEPROVIDERCAREINITIATE_GEN_ER
Clinic Visit Summary    2017      JESSICA JEFFERS Description:  Female :  1943   MRN: [id number] Provider:  Kim Musa M.D.    Primary Physician:  Francheska Smith MD  Referring Physician:  Brandon Loya MD     Insurance Information  MEDICARE [id number] 2017  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  Personal history of malignant neoplasm of thyroid [ICD10] Z85.850  Asymptomatic menopausal state [ICD10] Z78.0    Vitals (last recorded)  BP Pulse Height Weight Temp   123/72  70  59.06in (150cm)  124.12lb  (56.3kg) 36.6ºc     Allergies (as reported by patient)  Allergy Type Name Reaction     No Known Drug Allergies        Current Medications (as reported by patient)  Drug Name Dose Strength Route   levothyroxine 125 mg   Oral   Invokana 300 mg   Oral   nifedipine 60 mg   Oral   enalapril maleate 20 mg   Oral   pantoprazole 40 mg   Oral   lovastatin 40 mg   Oral   gabapentin 100 mg   Oral   ergocalciferol (vitamin D2) 5,000 units   Oral   Humalog 13 units   Subcutaneous   Levemir 43 units   Subcutaneous   Aspir-81     Oral   Calcium + Vitamin D 1 tab   Oral        Medications administered today  Drug Name Ordering Dose Actual Dose     Medications prescribed today  Date Description Ordering Physician      Orders  Date Description Ordering Physician     Next Visit Information    Appointment Date Appointment Time Activity   2017 10:00 AM MD Prep 30   2017 10:30 AM F/U 30 Minutes   2017 11:00 AM RN EDUCATION            Special instructions          Reminders  · If you did not schedule your follow up appointment at the time of your visit, please call the department at the number above.  · Please provide a copy of this summary of care to your next provider.     24-Jan-2020 04:09

## 2020-06-23 NOTE — ED PROVIDER NOTE - NSTIMEPROVIDERCAREINITIATE_GEN_ER
No changes    F/u in 6 months.      Once any ordered your tests have been completed and reviewed, we will release them to your [employer] account. With this we will advise with any further instructions. If you have any questions pertaining to your results or further instructions, please feel free to respond through My [facility] or call our office.     If you have any questions or concerns, please contact us through My [facility] or by phone.          [place] office [phone]             Tuesday, Thursdays and Fridays 8:00 AM - 5:00 PM       [place] office [phone]               Wednesdays 8:00 AM - 5:00 PM    If you receive a [employer] survey from our office or on your [employer] please take the time to fill out the survey and return it in the envelope provided. We want to give the best care possible. Your feedback helps us know how we are doing and we really appreciate it!     Patient Portal      Send messages to your doctor, view your test results, renew your prescriptions and more!   Go to [email or web address], click \"Sign up Now\" and enter your personal activation code  (on the 1st page).   [employer] is a powerful Internet tool that is quick, convenient and confidential.  With iBid2Save, you can view your results faster; communicate on-line with your [facility] physician's office; schedule many types of appointments; and find helpful healthcare links.        If you need to make a follow appointment, please call [phone]      If you have any questions, please send a message in [employer] or call [phone]     Thank you,   Dr. Cobian and staff.     
10-Rob-2018 06:40

## 2020-09-21 ENCOUNTER — NON-APPOINTMENT (OUTPATIENT)
Age: 85
End: 2020-09-21

## 2020-09-21 ENCOUNTER — APPOINTMENT (OUTPATIENT)
Dept: OPHTHALMOLOGY | Facility: CLINIC | Age: 85
End: 2020-09-21
Payer: MEDICARE

## 2020-09-21 PROCEDURE — 92012 INTRM OPH EXAM EST PATIENT: CPT

## 2021-05-17 NOTE — ED PROVIDER NOTE - CADM POA URETHRAL CATHETER
Cervicogenic Migraine    Herniated Disc    Hyperlipidemia    Hyperparathyroidism    Renal Calculus or Stone    
No

## 2021-10-10 NOTE — CONSULT NOTE ADULT - CONSULT REQUESTED BY NAME
[address]  [address] [facility] [address] [phone]    Work/School Note    Date: 10/10/2021    To Whom It May concern:    Tom Banks was seen and treated today in the emergency room by the following provider(s):  Attending Provider: Betsy Jaramillo MD  Physician Assistant: Teddy Barry NP. Tom Banks is excused from work/school on 10/10/2021 through 10/13/2021. She is medically clear to return to work/school on 10/14/2021.         Sincerely,          Singh Solorzano NP
ed
med

## 2021-10-12 NOTE — ED ADULT NURSE NOTE - AS TEMP SITE
Pt called to report the following blood sugars since 10/1. Pt did not change recommended insulin doses and is taking NPH 20 units AM and 20 units PM. Plus pt is taking R insulin 11 units with bfast and lunch and 13 units with dinner.    Fastin, 243, 240, 180, 233, 149, 145, 196, 219, 199, 208    Dinner: 135, 89, 109, 136, 103, 157, 192, 119, 132, 142    Pt advised change NPH to 18 units in AM and 22 units in PM. Continue R as you are taking it.   Patient verbalized understanding and has no further questions.    
oral

## 2021-10-29 ENCOUNTER — NON-APPOINTMENT (OUTPATIENT)
Age: 86
End: 2021-10-29

## 2021-10-29 ENCOUNTER — APPOINTMENT (OUTPATIENT)
Dept: OPHTHALMOLOGY | Facility: CLINIC | Age: 86
End: 2021-10-29
Payer: MEDICARE

## 2021-10-29 PROCEDURE — 92134 CPTRZ OPH DX IMG PST SGM RTA: CPT

## 2021-10-29 PROCEDURE — 92014 COMPRE OPH EXAM EST PT 1/>: CPT

## 2021-11-18 ENCOUNTER — APPOINTMENT (OUTPATIENT)
Dept: OPHTHALMOLOGY | Facility: CLINIC | Age: 86
End: 2021-11-18
Payer: MEDICARE

## 2021-11-18 ENCOUNTER — NON-APPOINTMENT (OUTPATIENT)
Age: 86
End: 2021-11-18

## 2021-11-18 PROCEDURE — 92133 CPTRZD OPH DX IMG PST SGM ON: CPT

## 2021-11-18 PROCEDURE — 92014 COMPRE OPH EXAM EST PT 1/>: CPT

## 2022-02-15 ENCOUNTER — APPOINTMENT (OUTPATIENT)
Dept: OPHTHALMOLOGY | Facility: CLINIC | Age: 87
End: 2022-02-15
Payer: MEDICARE

## 2022-02-15 ENCOUNTER — NON-APPOINTMENT (OUTPATIENT)
Age: 87
End: 2022-02-15

## 2022-02-15 PROCEDURE — 92012 INTRM OPH EXAM EST PATIENT: CPT

## 2022-02-24 ENCOUNTER — APPOINTMENT (OUTPATIENT)
Dept: OPHTHALMOLOGY | Facility: CLINIC | Age: 87
End: 2022-02-24

## 2022-07-29 ENCOUNTER — APPOINTMENT (OUTPATIENT)
Dept: OPHTHALMOLOGY | Facility: CLINIC | Age: 87
End: 2022-07-29

## 2022-08-12 ENCOUNTER — NON-APPOINTMENT (OUTPATIENT)
Age: 87
End: 2022-08-12

## 2022-08-12 ENCOUNTER — APPOINTMENT (OUTPATIENT)
Dept: OPHTHALMOLOGY | Facility: CLINIC | Age: 87
End: 2022-08-12

## 2022-08-12 PROCEDURE — 92012 INTRM OPH EXAM EST PATIENT: CPT

## 2022-09-22 ENCOUNTER — APPOINTMENT (OUTPATIENT)
Dept: OPHTHALMOLOGY | Facility: CLINIC | Age: 87
End: 2022-09-22

## 2022-09-22 ENCOUNTER — NON-APPOINTMENT (OUTPATIENT)
Age: 87
End: 2022-09-22

## 2022-09-22 PROCEDURE — 92025 CPTRIZED CORNEAL TOPOGRAPHY: CPT

## 2022-09-22 PROCEDURE — 92286 ANT SGM IMG I&R SPECLR MIC: CPT

## 2022-09-22 PROCEDURE — 92250 FUNDUS PHOTOGRAPHY W/I&R: CPT

## 2022-09-22 PROCEDURE — 92012 INTRM OPH EXAM EST PATIENT: CPT

## 2022-11-04 NOTE — ED PROVIDER NOTE - DATE/TIME 1
Encounter Date: 11/4/2022       History     Chief Complaint   Patient presents with    Vomiting     Pt to ED with complaints of vomiting since approx 0300  this AM. Pt mother denies medications PTA. Mother estimated approx 5 episodes of vomiting.     4-year-old female presents with vomiting since 0200.  Mother states that she has had a small amount of diarrhea along with it.  Mother denies any fever, cough, congestion or any other associated symptoms.  At present patient is being treated for OM with fluoroquinolone drops secondary to her tubes.  Patient last threw up I waited the emergency department.    Review of patient's allergies indicates:  No Known Allergies  Past Medical History:   Diagnosis Date    Seasonal allergies      History reviewed. No pertinent surgical history.  Family History   Problem Relation Age of Onset    Hypertension Maternal Grandfather         Copied from mother's family history at birth    Irritable bowel syndrome Maternal Grandmother         Copied from mother's family history at birth    Rheum arthritis Maternal Grandmother         Copied from mother's family history at birth     Social History     Tobacco Use    Smoking status: Never    Smokeless tobacco: Never   Substance Use Topics    Alcohol use: Never     Review of Systems   Constitutional:  Negative for fever.   HENT:  Negative for sore throat.    Respiratory:  Negative for cough.    Cardiovascular:  Negative for palpitations.   Gastrointestinal:  Positive for diarrhea, nausea and vomiting.   Genitourinary:  Negative for difficulty urinating.   Musculoskeletal:  Negative for joint swelling.   Skin:  Negative for rash.   Neurological:  Negative for seizures.   Hematological:  Does not bruise/bleed easily.     Physical Exam     Initial Vitals [11/04/22 1253]   BP Pulse Resp Temp SpO2   -- 115 21 98.6 °F (37 °C) 100 %      MAP       --         Physical Exam    Nursing note and vitals reviewed.  Constitutional: She appears  well-developed and well-nourished. She is active.   HENT:   Right Ear: Tympanic membrane normal.   Left Ear: Tympanic membrane normal.   Nose: Nose normal. No nasal discharge.   Mouth/Throat: Mucous membranes are moist. Oropharynx is clear. Pharynx is normal.   Tube visualized on right.  No drainage   Eyes: Conjunctivae are normal.   Neck: Neck supple.   Normal range of motion.  Cardiovascular:  Normal rate, regular rhythm, S1 normal and S2 normal.        Pulses are strong.    Pulmonary/Chest: Effort normal and breath sounds normal. No nasal flaring or stridor. No respiratory distress. She has no wheezes. She has no rales. She exhibits no retraction.   Abdominal: Abdomen is soft. Bowel sounds are normal. She exhibits no distension. There is no abdominal tenderness. There is no rebound and no guarding.   Musculoskeletal:         General: Normal range of motion.      Cervical back: Normal range of motion and neck supple. No rigidity.     Neurological: She is alert. GCS score is 15. GCS eye subscore is 4. GCS verbal subscore is 5. GCS motor subscore is 6.   Alert and age appropriate.  Dancing and jumping around exam room   Skin: Skin is warm and dry. Capillary refill takes less than 2 seconds.       ED Course   Procedures  Labs Reviewed - No data to display       Imaging Results    None          Medications   ondansetron disintegrating tablet 4 mg (4 mg Oral Given 11/4/22 1304)     Medical Decision Making:   Differential Diagnosis:   Gastroenteritis, viral illness, dehydration  ED Management:  Diagnosis management comments: This is an urgent evaluation of a 4-year-old female that presented to the ER with c/o vomiting and diarrhea since 0 200. Pts exam was as above.     Patient has had no emesis in emergency department.  Zofran was given upon arrival.  She is tolerating p.o. fluids.  Mother has been encouraged to continue with p.o. fluids.  I will discharge patient with Zofran.  She is to follow-up with her PCP as  needed.    Based on exam today - I have low suspicion for medical, surgical or other life threatening condition and I believe pt is safe for discharge and outpatient f/u.    Pt, and mother, verbalizes understanding of d/c instructions and will return for worsening condition.                              Clinical Impression:   Final diagnoses:  [K52.9] Gastroenteritis (Primary)      ED Disposition Condition    Discharge Stable          ED Prescriptions       Medication Sig Dispense Start Date End Date Auth. Provider    ondansetron (ZOFRAN) 4 MG tablet Take 1 tablet (4 mg total) by mouth every 8 (eight) hours as needed for Nausea. 12 tablet 11/4/2022 -- Rosio Kuhn NP          Follow-up Information       Follow up With Specialties Details Why Contact Info    Kaylan Wright MD Pediatrics Schedule an appointment as soon as possible for a visit   [address] [facility] [address]  [facility] [id number]  [phone]      [facility] - Emergency Dept Emergency Medicine  If symptoms worsen or any other concerns [address] [place], [place] [address] [id number] [phone]             Rosio Kuhn NP  11/04/22 0349     26-Jun-2017 15:20

## 2022-11-22 ENCOUNTER — APPOINTMENT (OUTPATIENT)
Dept: OPHTHALMOLOGY | Facility: CLINIC | Age: 87
End: 2022-11-22

## 2022-11-22 ENCOUNTER — NON-APPOINTMENT (OUTPATIENT)
Age: 87
End: 2022-11-22

## 2022-11-22 PROCEDURE — 92012 INTRM OPH EXAM EST PATIENT: CPT

## 2022-12-02 ENCOUNTER — APPOINTMENT (OUTPATIENT)
Dept: OPHTHALMOLOGY | Facility: CLINIC | Age: 87
End: 2022-12-02

## 2022-12-02 ENCOUNTER — NON-APPOINTMENT (OUTPATIENT)
Age: 87
End: 2022-12-02

## 2022-12-02 PROCEDURE — 99215 OFFICE O/P EST HI 40 MIN: CPT

## 2022-12-02 PROCEDURE — 92285 EXTERNAL OCULAR PHOTOGRAPHY: CPT | Mod: LT

## 2022-12-19 ENCOUNTER — APPOINTMENT (OUTPATIENT)
Dept: OPHTHALMOLOGY | Facility: CLINIC | Age: 87
End: 2022-12-19

## 2023-02-02 ENCOUNTER — APPOINTMENT (OUTPATIENT)
Dept: OPHTHALMOLOGY | Facility: CLINIC | Age: 88
End: 2023-02-02
Payer: MEDICARE

## 2023-02-02 ENCOUNTER — NON-APPOINTMENT (OUTPATIENT)
Age: 88
End: 2023-02-02

## 2023-02-02 PROCEDURE — 92014 COMPRE OPH EXAM EST PT 1/>: CPT

## 2023-03-15 NOTE — ED PROVIDER NOTE - PRINCIPAL DIAGNOSIS
Called pt regarding Dr. Aquino message. Medication was approved and sent to pt pharmacy. The doctor would also like for him to get zinc level drawn. The order has been entered, he just needs to go to the lab to get the blood work done. No answer, but left detailed VM regarding this. If he has any questions he can give the office a call back.   
Received a fax from pt [facility] for refill on the zinc sulfate 220mg capsules. Sent new script to pt pharmacy.   
Visual blurriness

## 2023-03-21 ENCOUNTER — APPOINTMENT (OUTPATIENT)
Dept: OPHTHALMOLOGY | Facility: CLINIC | Age: 88
End: 2023-03-21

## 2023-04-09 ENCOUNTER — INPATIENT (INPATIENT)
Facility: HOSPITAL | Age: 88
LOS: 2 days | Discharge: NURSING, REHAB OR LONG-TERM CARE | End: 2023-04-12
Attending: INTERNAL MEDICINE | Admitting: INTERNAL MEDICINE
Payer: MEDICARE

## 2023-04-09 VITALS
OXYGEN SATURATION: 100 % | SYSTOLIC BLOOD PRESSURE: 134 MMHG | RESPIRATION RATE: 17 BRPM | TEMPERATURE: 98 F | DIASTOLIC BLOOD PRESSURE: 73 MMHG | HEART RATE: 105 BPM

## 2023-04-09 DIAGNOSIS — Z29.9 ENCOUNTER FOR PROPHYLACTIC MEASURES, UNSPECIFIED: ICD-10-CM

## 2023-04-09 DIAGNOSIS — R42 DIZZINESS AND GIDDINESS: ICD-10-CM

## 2023-04-09 DIAGNOSIS — N40.0 BENIGN PROSTATIC HYPERPLASIA WITHOUT LOWER URINARY TRACT SYMPTOMS: ICD-10-CM

## 2023-04-09 DIAGNOSIS — H40.9 UNSPECIFIED GLAUCOMA: ICD-10-CM

## 2023-04-09 DIAGNOSIS — Z09 ENCOUNTER FOR FOLLOW-UP EXAMINATION AFTER COMPLETED TREATMENT FOR CONDITIONS OTHER THAN MALIGNANT NEOPLASM: Chronic | ICD-10-CM

## 2023-04-09 DIAGNOSIS — E78.5 HYPERLIPIDEMIA, UNSPECIFIED: ICD-10-CM

## 2023-04-09 DIAGNOSIS — E11.9 TYPE 2 DIABETES MELLITUS WITHOUT COMPLICATIONS: ICD-10-CM

## 2023-04-09 LAB
ALBUMIN SERPL ELPH-MCNC: 4.1 G/DL — SIGNIFICANT CHANGE UP (ref 3.3–5)
ALP SERPL-CCNC: 70 U/L — SIGNIFICANT CHANGE UP (ref 40–120)
ALT FLD-CCNC: 26 U/L — SIGNIFICANT CHANGE UP (ref 4–41)
ANION GAP SERPL CALC-SCNC: 13 MMOL/L — SIGNIFICANT CHANGE UP (ref 7–14)
APPEARANCE UR: CLEAR — SIGNIFICANT CHANGE UP
APTT BLD: 27.7 SEC — SIGNIFICANT CHANGE UP (ref 27–36.3)
AST SERPL-CCNC: 27 U/L — SIGNIFICANT CHANGE UP (ref 4–40)
BASOPHILS # BLD AUTO: 0.04 K/UL — SIGNIFICANT CHANGE UP (ref 0–0.2)
BASOPHILS NFR BLD AUTO: 0.6 % — SIGNIFICANT CHANGE UP (ref 0–2)
BILIRUB SERPL-MCNC: 0.3 MG/DL — SIGNIFICANT CHANGE UP (ref 0.2–1.2)
BILIRUB UR-MCNC: NEGATIVE — SIGNIFICANT CHANGE UP
BUN SERPL-MCNC: 10 MG/DL — SIGNIFICANT CHANGE UP (ref 7–23)
CALCIUM SERPL-MCNC: 9.4 MG/DL — SIGNIFICANT CHANGE UP (ref 8.4–10.5)
CHLORIDE SERPL-SCNC: 100 MMOL/L — SIGNIFICANT CHANGE UP (ref 98–107)
CO2 SERPL-SCNC: 23 MMOL/L — SIGNIFICANT CHANGE UP (ref 22–31)
COLOR SPEC: SIGNIFICANT CHANGE UP
CREAT SERPL-MCNC: 0.77 MG/DL — SIGNIFICANT CHANGE UP (ref 0.5–1.3)
DIFF PNL FLD: NEGATIVE — SIGNIFICANT CHANGE UP
EGFR: 87 ML/MIN/1.73M2 — SIGNIFICANT CHANGE UP
EOSINOPHIL # BLD AUTO: 0.21 K/UL — SIGNIFICANT CHANGE UP (ref 0–0.5)
EOSINOPHIL NFR BLD AUTO: 3.2 % — SIGNIFICANT CHANGE UP (ref 0–6)
FLUAV AG NPH QL: SIGNIFICANT CHANGE UP
FLUBV AG NPH QL: SIGNIFICANT CHANGE UP
GLUCOSE SERPL-MCNC: 298 MG/DL — HIGH (ref 70–99)
GLUCOSE UR QL: ABNORMAL
HCT VFR BLD CALC: 37.3 % — LOW (ref 39–50)
HGB BLD-MCNC: 12.6 G/DL — LOW (ref 13–17)
IANC: 4.11 K/UL — SIGNIFICANT CHANGE UP (ref 1.8–7.4)
IMM GRANULOCYTES NFR BLD AUTO: 0.2 % — SIGNIFICANT CHANGE UP (ref 0–0.9)
INR BLD: 1.03 RATIO — SIGNIFICANT CHANGE UP (ref 0.88–1.16)
KETONES UR-MCNC: NEGATIVE — SIGNIFICANT CHANGE UP
LEUKOCYTE ESTERASE UR-ACNC: NEGATIVE — SIGNIFICANT CHANGE UP
LYMPHOCYTES # BLD AUTO: 1.41 K/UL — SIGNIFICANT CHANGE UP (ref 1–3.3)
LYMPHOCYTES # BLD AUTO: 21.6 % — SIGNIFICANT CHANGE UP (ref 13–44)
MCHC RBC-ENTMCNC: 31.6 PG — SIGNIFICANT CHANGE UP (ref 27–34)
MCHC RBC-ENTMCNC: 33.8 GM/DL — SIGNIFICANT CHANGE UP (ref 32–36)
MCV RBC AUTO: 93.5 FL — SIGNIFICANT CHANGE UP (ref 80–100)
MONOCYTES # BLD AUTO: 0.74 K/UL — SIGNIFICANT CHANGE UP (ref 0–0.9)
MONOCYTES NFR BLD AUTO: 11.3 % — SIGNIFICANT CHANGE UP (ref 2–14)
NEUTROPHILS # BLD AUTO: 4.11 K/UL — SIGNIFICANT CHANGE UP (ref 1.8–7.4)
NEUTROPHILS NFR BLD AUTO: 63.1 % — SIGNIFICANT CHANGE UP (ref 43–77)
NITRITE UR-MCNC: NEGATIVE — SIGNIFICANT CHANGE UP
NRBC # BLD: 0 /100 WBCS — SIGNIFICANT CHANGE UP (ref 0–0)
NRBC # FLD: 0 K/UL — SIGNIFICANT CHANGE UP (ref 0–0)
PH UR: 7.5 — SIGNIFICANT CHANGE UP (ref 5–8)
PLATELET # BLD AUTO: 293 K/UL — SIGNIFICANT CHANGE UP (ref 150–400)
POTASSIUM SERPL-MCNC: 4.2 MMOL/L — SIGNIFICANT CHANGE UP (ref 3.5–5.3)
POTASSIUM SERPL-SCNC: 4.2 MMOL/L — SIGNIFICANT CHANGE UP (ref 3.5–5.3)
PROT SERPL-MCNC: 7.7 G/DL — SIGNIFICANT CHANGE UP (ref 6–8.3)
PROT UR-MCNC: ABNORMAL
PROTHROM AB SERPL-ACNC: 12 SEC — SIGNIFICANT CHANGE UP (ref 10.5–13.4)
RBC # BLD: 3.99 M/UL — LOW (ref 4.2–5.8)
RBC # FLD: 11.4 % — SIGNIFICANT CHANGE UP (ref 10.3–14.5)
RSV RNA NPH QL NAA+NON-PROBE: SIGNIFICANT CHANGE UP
SARS-COV-2 RNA SPEC QL NAA+PROBE: SIGNIFICANT CHANGE UP
SODIUM SERPL-SCNC: 136 MMOL/L — SIGNIFICANT CHANGE UP (ref 135–145)
SP GR SPEC: 1.03 — SIGNIFICANT CHANGE UP (ref 1.01–1.05)
TROPONIN T, HIGH SENSITIVITY RESULT: 7 NG/L — SIGNIFICANT CHANGE UP
UROBILINOGEN FLD QL: SIGNIFICANT CHANGE UP
WBC # BLD: 6.52 K/UL — SIGNIFICANT CHANGE UP (ref 3.8–10.5)
WBC # FLD AUTO: 6.52 K/UL — SIGNIFICANT CHANGE UP (ref 3.8–10.5)

## 2023-04-09 PROCEDURE — 70496 CT ANGIOGRAPHY HEAD: CPT | Mod: 26,MA

## 2023-04-09 PROCEDURE — 99232 SBSQ HOSP IP/OBS MODERATE 35: CPT | Mod: GC

## 2023-04-09 PROCEDURE — 99285 EMERGENCY DEPT VISIT HI MDM: CPT

## 2023-04-09 PROCEDURE — 70498 CT ANGIOGRAPHY NECK: CPT | Mod: 26,MA

## 2023-04-09 RX ORDER — MECLIZINE HCL 12.5 MG
50 TABLET ORAL ONCE
Refills: 0 | Status: COMPLETED | OUTPATIENT
Start: 2023-04-09 | End: 2023-04-09

## 2023-04-09 RX ORDER — SODIUM CHLORIDE 9 MG/ML
500 INJECTION INTRAMUSCULAR; INTRAVENOUS; SUBCUTANEOUS ONCE
Refills: 0 | Status: COMPLETED | OUTPATIENT
Start: 2023-04-09 | End: 2023-04-09

## 2023-04-09 RX ORDER — SITAGLIPTIN 50 MG/1
1 TABLET, FILM COATED ORAL
Refills: 0 | DISCHARGE

## 2023-04-09 RX ORDER — AMLODIPINE BESYLATE 2.5 MG/1
1 TABLET ORAL
Refills: 0 | DISCHARGE

## 2023-04-09 RX ORDER — LANOLIN ALCOHOL/MO/W.PET/CERES
3 CREAM (GRAM) TOPICAL AT BEDTIME
Refills: 0 | Status: DISCONTINUED | OUTPATIENT
Start: 2023-04-09 | End: 2023-04-12

## 2023-04-09 RX ORDER — INSULIN ASPART 100 [IU]/ML
6 INJECTION, SOLUTION SUBCUTANEOUS
Refills: 0 | DISCHARGE

## 2023-04-09 RX ORDER — LATANOPROST 0.05 MG/ML
1 SOLUTION/ DROPS OPHTHALMIC; TOPICAL AT BEDTIME
Refills: 0 | Status: DISCONTINUED | OUTPATIENT
Start: 2023-04-09 | End: 2023-04-12

## 2023-04-09 RX ORDER — SENNA PLUS 8.6 MG/1
2 TABLET ORAL AT BEDTIME
Refills: 0 | Status: DISCONTINUED | OUTPATIENT
Start: 2023-04-09 | End: 2023-04-12

## 2023-04-09 RX ORDER — GLYCERIN ADULT
1 SUPPOSITORY, RECTAL RECTAL
Refills: 0 | DISCHARGE

## 2023-04-09 RX ORDER — FINASTERIDE 5 MG/1
1 TABLET, FILM COATED ORAL
Refills: 0 | DISCHARGE

## 2023-04-09 RX ORDER — DORZOLAMIDE HYDROCHLORIDE 20 MG/ML
1 SOLUTION/ DROPS OPHTHALMIC EVERY 8 HOURS
Refills: 0 | Status: DISCONTINUED | OUTPATIENT
Start: 2023-04-09 | End: 2023-04-10

## 2023-04-09 RX ORDER — AMLODIPINE BESYLATE AND BENAZEPRIL HYDROCHLORIDE 10; 20 MG/1; MG/1
1 CAPSULE ORAL
Refills: 0 | DISCHARGE

## 2023-04-09 RX ORDER — DORZOLAMIDE HYDROCHLORIDE 20 MG/ML
1 SOLUTION/ DROPS OPHTHALMIC
Refills: 0 | DISCHARGE

## 2023-04-09 RX ORDER — INSULIN GLARGINE 100 [IU]/ML
25 INJECTION, SOLUTION SUBCUTANEOUS
Refills: 0 | DISCHARGE

## 2023-04-09 RX ORDER — ONDANSETRON 8 MG/1
4 TABLET, FILM COATED ORAL EVERY 8 HOURS
Refills: 0 | Status: DISCONTINUED | OUTPATIENT
Start: 2023-04-09 | End: 2023-04-12

## 2023-04-09 RX ORDER — METFORMIN HYDROCHLORIDE 850 MG/1
1 TABLET ORAL
Refills: 0 | DISCHARGE

## 2023-04-09 RX ORDER — DOXAZOSIN MESYLATE 4 MG
1 TABLET ORAL
Refills: 0 | DISCHARGE

## 2023-04-09 RX ORDER — FERROUS SULFATE 325(65) MG
1 TABLET ORAL
Refills: 0 | DISCHARGE

## 2023-04-09 RX ORDER — MECLIZINE HCL 12.5 MG
25 TABLET ORAL
Refills: 0 | Status: DISCONTINUED | OUTPATIENT
Start: 2023-04-10 | End: 2023-04-12

## 2023-04-09 RX ORDER — TIMOLOL 0.5 %
1 DROPS OPHTHALMIC (EYE)
Refills: 0 | Status: DISCONTINUED | OUTPATIENT
Start: 2023-04-09 | End: 2023-04-12

## 2023-04-09 RX ORDER — AMLODIPINE BESYLATE 2.5 MG/1
5 TABLET ORAL DAILY
Refills: 0 | Status: DISCONTINUED | OUTPATIENT
Start: 2023-04-09 | End: 2023-04-12

## 2023-04-09 RX ORDER — DOXAZOSIN MESYLATE 4 MG
1 TABLET ORAL
Qty: 0 | Refills: 0 | DISCHARGE

## 2023-04-09 RX ADMIN — SODIUM CHLORIDE 500 MILLILITER(S): 9 INJECTION INTRAMUSCULAR; INTRAVENOUS; SUBCUTANEOUS at 12:24

## 2023-04-09 RX ADMIN — Medication 50 MILLIGRAM(S): at 14:17

## 2023-04-09 RX ADMIN — SODIUM CHLORIDE 500 MILLILITER(S): 9 INJECTION INTRAMUSCULAR; INTRAVENOUS; SUBCUTANEOUS at 14:16

## 2023-04-09 NOTE — H&P ADULT - PROBLEM SELECTOR PLAN 1
Evaluated by neurology in ED. Reproducible with head movements. CTA head and neck unremarkable without acute intracranial pathology or circulation defect. 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 Less likely metabolic or infectious i/s/o afebrile, no leukocytosis, CMP WNL.     - as per neuro recs:        - Orthostatic VS      - EKG       - meclizine 25mg BID PRN ( can increase to 50mg Q8 if necessary)     - zofran 8mg Q8H PRN OR reglan 4mg Q8H PRN for n/v     - clonezepam 0.5mg Q8H PRN for 2-3 days      - vestibular rehab referral on dc      - teach pt albert reese     - ENT and neuro f/u as outpatient   - PT consult

## 2023-04-09 NOTE — ED PROVIDER NOTE - CLINICAL SUMMARY MEDICAL DECISION MAKING FREE TEXT BOX
[employer] DO PGY-3:  Pt w/ hx of DM and leaglly blind p/w dizziness. Began last night and worse then this morning (7am). Will obtain labs, imaging. Will screen for infectious etiology vs electrolyte abnormalities. Possibly BPPV however concern for intracranial pathology as well. Will reassess. Dispo pending workup.

## 2023-04-09 NOTE — H&P ADULT - PROBLEM SELECTOR PLAN 6
DVT ppx: lovenox   Diet: CC  Dispo: back to [facility] pending medical optimization

## 2023-04-09 NOTE — ED ADULT NURSE NOTE - OBJECTIVE STATEMENT
Patient presents to ED because he "has been feeling unwell this morning" with dizziness. States he "pressed call bell at nursing home and no one came" so he called 911. Reports recent change in diabetes medications and he had episode of hypoglycemia a few days ago, so he is concerned about it happening again. Patient is AA&Ox4, in no acute distress, calm, cooperative. Legally blind. Respirations even, unlabored on room air. Denies CP, SOB, dyspnea, N/V, fevers. Hx. DM, HTN, HLD.

## 2023-04-09 NOTE — H&P ADULT - NSHPPHYSICALEXAM_GEN_ALL_CORE
VITALS:   T(C): 36.3 (04-09-23 @ 19:49), Max: 36.9 (04-09-23 @ 11:00)  HR: 79 (04-09-23 @ 19:49) (69 - 105)  BP: 118/53 (04-09-23 @ 19:49) (118/53 - 143/78)  RR: 18 (04-09-23 @ 19:49) (17 - 18)  SpO2: 98% (04-09-23 @ 19:49) (98% - 100%)    GENERAL: NAD, lying in bed comfortably  HEAD:  Normocephalic  EYES: Sclera clear  ENT: Moist mucous membranes  NECK: Supple, No JVD  CHEST/LUNG: Clear to auscultation bilaterally; No rales, rhonchi, wheezing, or rubs. Unlabored respirations  HEART: Regular rate and rhythm; No murmurs, rubs, or gallops  ABDOMEN: BSx4; Soft, nontender, nondistended  EXTREMITIES:  2+ Peripheral Pulses, brisk capillary refill. 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 Strength WNL in all 4 extremities  SKIN: No rashes or lesions

## 2023-04-09 NOTE — ED ADULT NURSE REASSESSMENT NOTE - NS ED NURSE REASSESS COMMENT FT1
BREAK RN: Pt alert and orientedx4, in no apparent distress, no code stroke called. Pt complains of light-headedness, denies chest pain, SOB, pain. Call bell within reach, bed in lowest position, will continue to monitor.

## 2023-04-09 NOTE — H&P ADULT - PROBLEM SELECTOR PLAN 3
home: metformin 500mg QD, januvia 100mg QD,     - ISS for now home: lantus 25U QHS, novolog 6U TID with meals     -will do lantus 15U QHS and 3U TID for now, uptitrate as needed  - ISS home: lantus 25U QHS, novolog 6U TID with meals. Patient notes he is frequently found to be hypoglycemic with symptoms (nausea, weakness, lightheadedness).     - will do lantus 15U QHS and 3U TID for now, uptitrate as needed  - ISS

## 2023-04-09 NOTE — ED PROVIDER NOTE - ATTENDING CONTRIBUTION TO CARE
Dr. Ramos:  I have personally performed a face to face bedside history and physical examination of this patient. I have discussed the history, examination, review of systems, assessment and plan of management with the resident. I have reviewed the electronic medical record and amended it to reflect my history, review of systems, physical exam, assessment and plan.    82M h/o DM,  HTN, hyperlipidemia, legally blind, presents from [facility] for dizziness.  Pt describes feeling lightheaded and room spinning since last night, worse this morning when he tried to stand up.  ROS otherwise negative.    Exam:  - nad  - rrr  - ctab  - abd soft ntnd  - no focal neuro deficits but patient with great difficulty standing up because he feels dizzy     A/P  - dizziness, consider peripheral vs central vertigo vs cardiac or orthostatic etiology, also eval for infection/toxic/metabolic process  - cbc, cmp, trop, ekg, ua, cxr, flu/covid, CTA head/neck  - appreciate neuro consult recs

## 2023-04-09 NOTE — H&P ADULT - NSHPLABSRESULTS_GEN_ALL_CORE
12.6   6.52  )-----------( 293      ( 09 Apr 2023 12:14 )             37.3     04-09    136  |  100  |  10  ----------------------------<  298<H>  4.2   |  23  |  0.77    Ca    9.4      09 Apr 2023 12:14    TPro  7.7  /  Alb  4.1  /  TBili  0.3  /  DBili  x   /  AST  27  /  ALT  26  /  AlkPhos  70  04-09      Creatinine, Serum: 0.77 mg/dL (04-09-23 @ 12:14)            WBC Count: 6.52 K/uL (04-09-23 @ 12:14)    SARS-CoV-2 Result: NotDetec (04-09-23 @ 12:14)      Alkaline Phosphatase, Serum: 70 U/L (04-09-23 @ 12:14)  Alanine Aminotransferase (ALT/SGPT): 26 U/L (04-09-23 @ 12:14)  Aspartate Aminotransferase (AST/SGOT): 27 U/L (04-09-23 @ 12:14)  Bilirubin Total, Serum: 0.3 mg/dL (04-09-23 @ 12:14) no

## 2023-04-09 NOTE — H&P ADULT - PROBLEM SELECTOR PLAN 2
Legally blind, at neurologic baseline currently. Follows with [facility] as outpatient.     - Current eyedrop regimen:  Timolol BID OU, dorzolamide BID OD, Xalatan QHS OU, artificial tears as needed.   - continue while inpatient Legally blind, at neurologic baseline currently. Follows with [facility] opt as outpatient.     - Current eyedrop regimen as per most recent optho note: Timolol BID OU, dorzolamide BID OD, Xalatan QHS OU, artificial tears TID  - continue while inpatient Legally blind, at neurologic baseline currently. Follows with [facility] opt as outpatient.     - Current eyedrop regimen as per most recent optho note (in [facility] [place]): Timolol BID OU, dorzolamide BID OD, Xalatan QHS OU, artificial tears TID  - continue while inpatient

## 2023-04-09 NOTE — CONSULT NOTE ADULT - ASSESSMENT
88y (1935) man with a PMHx significant for Legally blind and diabetic who presented to the ED from [facility]  for dizziness described as feeling unbalanced. This began last night around 10pm but resumed until 9am went away and then worsened. Pt denied any prior similar episodes. Pt stated that his symptoms worsen upon movement. Admits to not drinking a lot of water the past few days. No prior CVA. Symptoms reproducible when patient is trying to turn his head in the ER. Denied HA, numbness, tinnitus.    nihss 0   MRS 4    Impression:  acute vestibular syndrome likely 2/2 peripheral etiology vs less likely central etiology, r/o other causes such as toxic metabolic infectious    Recommendations    [] BP measurements in both arms + orthostatic VS  [] EKG to evaluate for arrhythmias  [] Consider IVFs  [] Meclizine 25mg BID PRN (can increase to 50mg Q8)  [] Ondansetron 8mg up to q8hr PRN OR Reglan 4mg PRN q8h PRN for N/V  [] Can try Clonzepam 0.5mg now then Q8H PRN (for 2 to 3 days)    Other:  [] Refer for Vestibular Rehab on discharge  [] Epley manuever print out  [] ENT and Neuro f/u outpatient    Case to be discussed with neurology attending

## 2023-04-09 NOTE — H&P ADULT - HISTORY OF PRESENT ILLNESS
Mr. Hicks is an 87yo M who is legally blind and has a history of diabetes who presents to the [facility] ED from [facility] with dizziness and balance difficulties since 10pm last night. Pt states that 9am this morning he briefly felt better but then soon after the dizziness returned and was worse than prior. He notes that the dizziness worsens with movement and changes in head position. He states he has never experienced similar symptoms in the past. When asked if anything has changed over the last few days, he admits to not drinking a lot of water. No history of prior CVA. Symptoms reproducible when patient is trying to turn his head in the ER. Denied HA, numbness, tinnitus.   Mr. Hikcs is an 87yo M who is legally blind and has a history of diabetes, glaucoma, BPH and HLD who presents to the [facility] ED from [facility] with dizziness and balance difficulties since 10pm last night. Pt states that 9am this morning he briefly felt better but then soon after the dizziness returned and was worse than prior. He notes that the dizziness worsens with movement and changes in head position. He states he has never experienced similar symptoms in the past. When asked if anything has changed over the last few days, he admits to not drinking a lot of water. No history of prior CVA. Denied HA, numbness, tinnitus, N/V.     Upon arrival to the ED, pt stable with T-afebrile, , /73, SpO2 100% on RA. Symptoms of dizziness found to be reproducible with head movements. Labs remarkable for mild anemia (Hgb 12.6), and hyperglycemia to . UA bland. CTA head and neck performed and found no defects in intracranial circulation. Neurology consulted by ED team who stated more likely peripheral etiology (acute vestibular syndrome). Pt now s/p 1L fluid, 50mg meclizine.

## 2023-04-09 NOTE — ED PROVIDER NOTE - PHYSICAL EXAMINATION
CONSTITUTIONAL: Well-developed; well-nourished; in no acute distress.   SKIN: warm, dry  HEAD: Normocephalic; atraumatic.  EYES: no conjunctival injection.   ENT: No nasal discharge; airway clear.  NECK: Supple; non tender.  CARD: S1, S2 normal; Regular rate and rhythm.   RESP: No wheezes, rales or rhonchi. Good air movement bilaterally.   ABD: soft ntnd, no guarding, no distention, no rigidity.   EXT:  No cyanosis or edema.   NEURO: No facial droop. No motor strength asymmetry in Upper or lower extremities. +dizziness reproduced w/ head turning   PSYCH: Cooperative, appropriate.

## 2023-04-09 NOTE — H&P ADULT - ATTENDING COMMENTS
88 year old male w/ DM, glaucoma (legally blind) and hypertension presents to the ER w/ sudden dizziness worse with head movement but no other focal neurologic deficit. Suspect peripheral etiology. Neuro consulted, rec meclizine prn. Will obtain PT consult. Check orthostatics and MR brain per neuro recs

## 2023-04-09 NOTE — ED PROVIDER NOTE - OBJECTIVE STATEMENT
[employer] GUY PGY-3:  82-year-old male past medical history legally blind and diabetic presents complaining of dizziness sensation described as room spinning.  Patient states it began last night but much worse at 7 AM this morning when he tried to stand up.  Symptoms reproducible when patient is trying to turn his head in the ER. [employer] GUY PGY-3:  88-year-old male past medical history legally blind and diabetic presents complaining of dizziness sensation described as room spinning.  Patient states it began last night but much worse at 7 AM this morning when he tried to stand up.  Symptoms reproducible when patient is trying to turn his head in the ER.

## 2023-04-09 NOTE — H&P ADULT - NSHPREVIEWOFSYSTEMS_GEN_ALL_CORE
REVIEW OF SYSTEMS:    CONSTITUTIONAL: No weakness, fevers or chills  EYES/ENT: +vertigo; No hroat pain   NECK: No pain or stiffness  RESPIRATORY: No cough, wheezing, hemoptysis; No shortness of breath  CARDIOVASCULAR: No chest pain or palpitations  GASTROINTESTINAL: No abdominal or epigastric pain. No nausea, vomiting, or hematemesis; No diarrhea or constipation. No melena or hematochezia.  GENITOURINARY: No dysuria, frequency or hematuria  NEUROLOGICAL: +dizziness, No numbness or weakness  SKIN: No itching, rashes REVIEW OF SYSTEMS:    CONSTITUTIONAL: No weakness, fevers or chills  EYES/ENT: +vertigo; No throat pain   NECK: No pain or stiffness  RESPIRATORY: No cough, wheezing, hemoptysis; No shortness of breath  CARDIOVASCULAR: No chest pain or palpitations  GASTROINTESTINAL: No abdominal or epigastric pain. No nausea, vomiting, or hematemesis; No diarrhea or constipation. No melena or hematochezia.  GENITOURINARY: No dysuria, frequency or hematuria  NEUROLOGICAL: +dizziness, No numbness or weakness  SKIN: No itching, rashes

## 2023-04-09 NOTE — ED ADULT TRIAGE NOTE - CHIEF COMPLAINT QUOTE
pt paris Ferris, has been having uncontrolled diabetes. pt aox4, states the nursing home isn't listening him regarding his usual routine. pt legally blind.

## 2023-04-10 LAB
A1C WITH ESTIMATED AVERAGE GLUCOSE RESULT: 9.5 % — HIGH (ref 4–5.6)
ALBUMIN SERPL ELPH-MCNC: 3.9 G/DL — SIGNIFICANT CHANGE UP (ref 3.3–5)
ALP SERPL-CCNC: 63 U/L — SIGNIFICANT CHANGE UP (ref 40–120)
ALT FLD-CCNC: 21 U/L — SIGNIFICANT CHANGE UP (ref 4–41)
ANION GAP SERPL CALC-SCNC: 11 MMOL/L — SIGNIFICANT CHANGE UP (ref 7–14)
AST SERPL-CCNC: 20 U/L — SIGNIFICANT CHANGE UP (ref 4–40)
BILIRUB SERPL-MCNC: 0.3 MG/DL — SIGNIFICANT CHANGE UP (ref 0.2–1.2)
BUN SERPL-MCNC: 9 MG/DL — SIGNIFICANT CHANGE UP (ref 7–23)
CALCIUM SERPL-MCNC: 9.4 MG/DL — SIGNIFICANT CHANGE UP (ref 8.4–10.5)
CHLORIDE SERPL-SCNC: 101 MMOL/L — SIGNIFICANT CHANGE UP (ref 98–107)
CHOLEST SERPL-MCNC: 135 MG/DL — SIGNIFICANT CHANGE UP
CO2 SERPL-SCNC: 24 MMOL/L — SIGNIFICANT CHANGE UP (ref 22–31)
CREAT SERPL-MCNC: 0.77 MG/DL — SIGNIFICANT CHANGE UP (ref 0.5–1.3)
EGFR: 87 ML/MIN/1.73M2 — SIGNIFICANT CHANGE UP
ESTIMATED AVERAGE GLUCOSE: 226 — SIGNIFICANT CHANGE UP
GLUCOSE BLDC GLUCOMTR-MCNC: 109 MG/DL — HIGH (ref 70–99)
GLUCOSE BLDC GLUCOMTR-MCNC: 164 MG/DL — HIGH (ref 70–99)
GLUCOSE BLDC GLUCOMTR-MCNC: 194 MG/DL — HIGH (ref 70–99)
GLUCOSE BLDC GLUCOMTR-MCNC: 194 MG/DL — HIGH (ref 70–99)
GLUCOSE BLDC GLUCOMTR-MCNC: 226 MG/DL — HIGH (ref 70–99)
GLUCOSE SERPL-MCNC: 195 MG/DL — HIGH (ref 70–99)
HCT VFR BLD CALC: 36.3 % — LOW (ref 39–50)
HDLC SERPL-MCNC: 41 MG/DL — SIGNIFICANT CHANGE UP
HGB BLD-MCNC: 12.5 G/DL — LOW (ref 13–17)
LIPID PNL WITH DIRECT LDL SERPL: 79 MG/DL — SIGNIFICANT CHANGE UP
MCHC RBC-ENTMCNC: 33 PG — SIGNIFICANT CHANGE UP (ref 27–34)
MCHC RBC-ENTMCNC: 34.4 GM/DL — SIGNIFICANT CHANGE UP (ref 32–36)
MCV RBC AUTO: 95.8 FL — SIGNIFICANT CHANGE UP (ref 80–100)
NON HDL CHOLESTEROL: 94 MG/DL — SIGNIFICANT CHANGE UP
NRBC # BLD: 0 /100 WBCS — SIGNIFICANT CHANGE UP (ref 0–0)
NRBC # FLD: 0 K/UL — SIGNIFICANT CHANGE UP (ref 0–0)
PLATELET # BLD AUTO: 258 K/UL — SIGNIFICANT CHANGE UP (ref 150–400)
POTASSIUM SERPL-MCNC: 4 MMOL/L — SIGNIFICANT CHANGE UP (ref 3.5–5.3)
POTASSIUM SERPL-SCNC: 4 MMOL/L — SIGNIFICANT CHANGE UP (ref 3.5–5.3)
PROT SERPL-MCNC: 7.2 G/DL — SIGNIFICANT CHANGE UP (ref 6–8.3)
RBC # BLD: 3.79 M/UL — LOW (ref 4.2–5.8)
RBC # FLD: 11.9 % — SIGNIFICANT CHANGE UP (ref 10.3–14.5)
SODIUM SERPL-SCNC: 136 MMOL/L — SIGNIFICANT CHANGE UP (ref 135–145)
TRIGL SERPL-MCNC: 73 MG/DL — SIGNIFICANT CHANGE UP
WBC # BLD: 4.98 K/UL — SIGNIFICANT CHANGE UP (ref 3.8–10.5)
WBC # FLD AUTO: 4.98 K/UL — SIGNIFICANT CHANGE UP (ref 3.8–10.5)

## 2023-04-10 PROCEDURE — 99223 1ST HOSP IP/OBS HIGH 75: CPT | Mod: GC

## 2023-04-10 RX ORDER — ENOXAPARIN SODIUM 100 MG/ML
40 INJECTION SUBCUTANEOUS EVERY 24 HOURS
Refills: 0 | Status: DISCONTINUED | OUTPATIENT
Start: 2023-04-10 | End: 2023-04-12

## 2023-04-10 RX ORDER — DEXTROSE 50 % IN WATER 50 %
15 SYRINGE (ML) INTRAVENOUS ONCE
Refills: 0 | Status: DISCONTINUED | OUTPATIENT
Start: 2023-04-10 | End: 2023-04-12

## 2023-04-10 RX ORDER — SODIUM CHLORIDE 9 MG/ML
1000 INJECTION, SOLUTION INTRAVENOUS
Refills: 0 | Status: DISCONTINUED | OUTPATIENT
Start: 2023-04-10 | End: 2023-04-12

## 2023-04-10 RX ORDER — INSULIN GLARGINE 100 [IU]/ML
15 INJECTION, SOLUTION SUBCUTANEOUS ONCE
Refills: 0 | Status: COMPLETED | OUTPATIENT
Start: 2023-04-10 | End: 2023-04-10

## 2023-04-10 RX ORDER — DEXTROSE 50 % IN WATER 50 %
12.5 SYRINGE (ML) INTRAVENOUS ONCE
Refills: 0 | Status: DISCONTINUED | OUTPATIENT
Start: 2023-04-10 | End: 2023-04-12

## 2023-04-10 RX ORDER — INSULIN LISPRO 100/ML
VIAL (ML) SUBCUTANEOUS
Refills: 0 | Status: DISCONTINUED | OUTPATIENT
Start: 2023-04-10 | End: 2023-04-12

## 2023-04-10 RX ORDER — INSULIN LISPRO 100/ML
3 VIAL (ML) SUBCUTANEOUS
Refills: 0 | Status: DISCONTINUED | OUTPATIENT
Start: 2023-04-10 | End: 2023-04-12

## 2023-04-10 RX ORDER — DORZOLAMIDE HYDROCHLORIDE 20 MG/ML
1 SOLUTION/ DROPS OPHTHALMIC
Refills: 0 | Status: DISCONTINUED | OUTPATIENT
Start: 2023-04-10 | End: 2023-04-12

## 2023-04-10 RX ORDER — DEXTROSE 50 % IN WATER 50 %
25 SYRINGE (ML) INTRAVENOUS ONCE
Refills: 0 | Status: DISCONTINUED | OUTPATIENT
Start: 2023-04-10 | End: 2023-04-12

## 2023-04-10 RX ORDER — INSULIN GLARGINE 100 [IU]/ML
15 INJECTION, SOLUTION SUBCUTANEOUS AT BEDTIME
Refills: 0 | Status: DISCONTINUED | OUTPATIENT
Start: 2023-04-10 | End: 2023-04-12

## 2023-04-10 RX ORDER — GLUCAGON INJECTION, SOLUTION 0.5 MG/.1ML
1 INJECTION, SOLUTION SUBCUTANEOUS ONCE
Refills: 0 | Status: DISCONTINUED | OUTPATIENT
Start: 2023-04-10 | End: 2023-04-12

## 2023-04-10 RX ADMIN — SENNA PLUS 2 TABLET(S): 8.6 TABLET ORAL at 22:24

## 2023-04-10 RX ADMIN — Medication 1: at 09:15

## 2023-04-10 RX ADMIN — Medication 3 UNIT(S): at 12:52

## 2023-04-10 RX ADMIN — Medication 1: at 12:52

## 2023-04-10 RX ADMIN — Medication 3 UNIT(S): at 09:17

## 2023-04-10 RX ADMIN — Medication 1 DROP(S): at 12:51

## 2023-04-10 RX ADMIN — Medication 25 MILLIGRAM(S): at 06:02

## 2023-04-10 RX ADMIN — Medication 1 DROP(S): at 17:05

## 2023-04-10 RX ADMIN — DORZOLAMIDE HYDROCHLORIDE 1 DROP(S): 20 SOLUTION/ DROPS OPHTHALMIC at 06:01

## 2023-04-10 RX ADMIN — Medication 1 DROP(S): at 22:24

## 2023-04-10 RX ADMIN — DORZOLAMIDE HYDROCHLORIDE 1 DROP(S): 20 SOLUTION/ DROPS OPHTHALMIC at 17:05

## 2023-04-10 RX ADMIN — Medication 1 DROP(S): at 06:01

## 2023-04-10 RX ADMIN — INSULIN GLARGINE 15 UNIT(S): 100 INJECTION, SOLUTION SUBCUTANEOUS at 03:13

## 2023-04-10 RX ADMIN — AMLODIPINE BESYLATE 5 MILLIGRAM(S): 2.5 TABLET ORAL at 06:01

## 2023-04-10 RX ADMIN — Medication 1 DROP(S): at 17:04

## 2023-04-10 NOTE — PROGRESS NOTE ADULT - PROBLEM SELECTOR PLAN 2
Legally blind, at neurologic baseline currently. Follows with [facility] opt as outpatient.     - Current eyedrop regimen as per most recent optho note (in [facility] [place]): Timolol BID OU, dorzolamide BID OD, Xalatan QHS OU, artificial tears TID  - continue while inpatient Legally blind, at neurologic baseline currently. Follows with [facility] opt as outpatient.   - Current eyedrop regimen as per most recent optho note (in [facility] [place]): Timolol BID OU, dorzolamide BID OD, Xalatan QHS OU, artificial tears TID  - continue while inpatient

## 2023-04-10 NOTE — PROGRESS NOTE ADULT - SUBJECTIVE AND OBJECTIVE BOX
PATIENT: VINOD LIZARRAGA, MRN: [id number]    CHIEF COMPLAINT: Patient is a 88y old  Male who presents with a chief complaint of Dizziness (2023 21:01)      INTERVAL HISTORY/OVERNIGHT EVENTS: No overnight events.     MEDICATIONS:  MEDICATIONS  (STANDING):  amLODIPine   Tablet 5 milliGRAM(s) Oral daily  artificial  tears Solution 1 Drop(s) Both EYES three times a day  dextrose 5%. 1000 milliLiter(s) (50 mL/Hr) IV Continuous <Continuous>  dextrose 5%. 1000 milliLiter(s) (100 mL/Hr) IV Continuous <Continuous>  dextrose 50% Injectable 25 Gram(s) IV Push once  dextrose 50% Injectable 12.5 Gram(s) IV Push once  dextrose 50% Injectable 25 Gram(s) IV Push once  dorzolamide 2% Ophthalmic Solution 1 Drop(s) Both EYES <User Schedule>  enoxaparin Injectable 40 milliGRAM(s) SubCutaneous every 24 hours  glucagon  Injectable 1 milliGRAM(s) IntraMuscular once  insulin glargine Injectable (LANTUS) 15 Unit(s) SubCutaneous at bedtime  insulin lispro (ADMELOG) corrective regimen sliding scale   SubCutaneous three times a day before meals  insulin lispro Injectable (ADMELOG) 3 Unit(s) SubCutaneous three times a day with meals  latanoprost 0.005% Ophthalmic Solution 1 Drop(s) Both EYES at bedtime  senna 2 Tablet(s) Oral at bedtime  timolol 0.5% Solution 1 Drop(s) Both EYES two times a day    MEDICATIONS  (PRN):  aluminum hydroxide/magnesium hydroxide/simethicone Suspension 30 milliLiter(s) Oral every 4 hours PRN Dyspepsia  dextrose Oral Gel 15 Gram(s) Oral once PRN Blood Glucose LESS THAN 70 milliGRAM(s)/deciliter  meclizine 25 milliGRAM(s) Oral two times a day PRN Dizziness  melatonin 3 milliGRAM(s) Oral at bedtime PRN Insomnia  ondansetron Injectable 4 milliGRAM(s) IV Push every 8 hours PRN Nausea and/or Vomiting      ALLERGIES: Allergies    Ornex (Other)  Percocet 10/325 (Other)  Valium (Other)    Intolerances        OBJECTIVE:  ICU Vital Signs Last 24 Hrs  T(C): 36.4 (10 Apr 2023 05:50), Max: 36.9 (2023 11:00)  T(F): 97.5 (10 Apr 2023 05:50), Max: 98.5 (2023 11:00)  HR: 63 (10 Apr 2023 05:50) (63 - 105)  BP: 143/74 (10 Apr 2023 05:50) (118/53 - 143/78)  BP(mean): --  ABP: --  ABP(mean): --  RR: 18 (10 Apr 2023 05:50) (17 - 18)  SpO2: 100% (10 Apr 2023 05:50) (98% - 100%)    O2 Parameters below as of 10 Apr 2023 05:50  Patient On (Oxygen Delivery Method): room air            Adult Advanced Hemodynamics Last 24 Hrs  CVP(mm Hg): --  CVP(cm H2O): --  CO: --  CI: --  PA: --  PA(mean): --  PCWP: --  SVR: --  SVRI: --  PVR: --  PVRI: --  CAPILLARY BLOOD GLUCOSE      POCT Blood Glucose.: 226 mg/dL (10 Apr 2023 02:47)  POCT Blood Glucose.: 258 mg/dL (2023 11:07)    CAPILLARY BLOOD GLUCOSE      POCT Blood Glucose.: 226 mg/dL (10 Apr 2023 02:47)    I&O's Summary    Daily     Daily     PHYSICAL EXAMINATION:  General: Comfortable, no acute distress, cooperative with exam.  HEENT: PERRLA, EOMI, moist mucous membranes.  Respiratory: CTAB, normal respiratory effort, no coughing, wheezes, crackles, or rales.  CV: RRR, S1S2, no murmurs, rubs or gallops. No JVD. Distal pulses intact.  Abdominal: Soft, nontender, nondistended, no rebound or guarding, normal bowel sounds.  Neurology: AOx3, no focal neuro defects, SALCIDO x 4.  Extremities: No pitting edema, + Peripheral pulses.  Incisions:   Tubes:    LABS:                          12.5   4.98  )-----------( 258      ( 10 Apr 2023 06:49 )             36.3     04-09    136  |  100  |  10  ----------------------------<  298<H>  4.2   |  23  |  0.77    Ca    9.4      2023 12:14    TPro  7.7  /  Alb  4.1  /  TBili  0.3  /  DBili  x   /  AST  27  /  ALT  26  /  AlkPhos  70  04-09    LIVER FUNCTIONS - ( 2023 12:14 )  Alb: 4.1 g/dL / Pro: 7.7 g/dL / ALK PHOS: 70 U/L / ALT: 26 U/L / AST: 27 U/L / GGT: x           PT/INR - ( 2023 12:14 )   PT: 12.0 sec;   INR: 1.03 ratio         PTT - ( 2023 12:14 )  PTT:27.7 sec        Urinalysis Basic - ( 2023 14:20 )    Color: Light Yellow / Appearance: Clear / S.030 / pH: x  Gluc: x / Ketone: Negative  / Bili: Negative / Urobili: <2 mg/dL   Blood: x / Protein: Trace / Nitrite: Negative   Leuk Esterase: Negative / RBC: x / WBC x   Sq Epi: x / Non Sq Epi: x / Bacteria: x       PATIENT: VINOD LIZARRAGA, MRN: [id number]    CHIEF COMPLAINT: Patient is a 88y old  Male who presents with a chief complaint of Dizziness (2023 21:01)      INTERVAL HISTORY/OVERNIGHT EVENTS: No overnight events. Patient denies dizziness, palpitations, CP, SOB, abd pain.     MEDICATIONS:  MEDICATIONS  (STANDING):  amLODIPine   Tablet 5 milliGRAM(s) Oral daily  artificial  tears Solution 1 Drop(s) Both EYES three times a day  dextrose 5%. 1000 milliLiter(s) (50 mL/Hr) IV Continuous <Continuous>  dextrose 5%. 1000 milliLiter(s) (100 mL/Hr) IV Continuous <Continuous>  dextrose 50% Injectable 25 Gram(s) IV Push once  dextrose 50% Injectable 12.5 Gram(s) IV Push once  dextrose 50% Injectable 25 Gram(s) IV Push once  dorzolamide 2% Ophthalmic Solution 1 Drop(s) Both EYES <User Schedule>  enoxaparin Injectable 40 milliGRAM(s) SubCutaneous every 24 hours  glucagon  Injectable 1 milliGRAM(s) IntraMuscular once  insulin glargine Injectable (LANTUS) 15 Unit(s) SubCutaneous at bedtime  insulin lispro (ADMELOG) corrective regimen sliding scale   SubCutaneous three times a day before meals  insulin lispro Injectable (ADMELOG) 3 Unit(s) SubCutaneous three times a day with meals  latanoprost 0.005% Ophthalmic Solution 1 Drop(s) Both EYES at bedtime  senna 2 Tablet(s) Oral at bedtime  timolol 0.5% Solution 1 Drop(s) Both EYES two times a day    MEDICATIONS  (PRN):  aluminum hydroxide/magnesium hydroxide/simethicone Suspension 30 milliLiter(s) Oral every 4 hours PRN Dyspepsia  dextrose Oral Gel 15 Gram(s) Oral once PRN Blood Glucose LESS THAN 70 milliGRAM(s)/deciliter  meclizine 25 milliGRAM(s) Oral two times a day PRN Dizziness  melatonin 3 milliGRAM(s) Oral at bedtime PRN Insomnia  ondansetron Injectable 4 milliGRAM(s) IV Push every 8 hours PRN Nausea and/or Vomiting      ALLERGIES: Allergies    Ornex (Other)  Percocet 10/325 (Other)  Valium (Other)    Intolerances        OBJECTIVE:  ICU Vital Signs Last 24 Hrs  T(C): 36.4 (10 Apr 2023 05:50), Max: 36.9 (2023 11:00)  T(F): 97.5 (10 Apr 2023 05:50), Max: 98.5 (2023 11:00)  HR: 63 (10 Apr 2023 05:50) (63 - 105)  BP: 143/74 (10 Apr 2023 05:50) (118/53 - 143/78)  BP(mean): --  ABP: --  ABP(mean): --  RR: 18 (10 Apr 2023 05:50) (17 - 18)  SpO2: 100% (10 Apr 2023 05:50) (98% - 100%)    O2 Parameters below as of 10 Apr 2023 05:50  Patient On (Oxygen Delivery Method): room air            Adult Advanced Hemodynamics Last 24 Hrs  CVP(mm Hg): --  CVP(cm H2O): --  CO: --  CI: --  PA: --  PA(mean): --  PCWP: --  SVR: --  SVRI: --  PVR: --  PVRI: --  CAPILLARY BLOOD GLUCOSE      POCT Blood Glucose.: 226 mg/dL (10 Apr 2023 02:47)  POCT Blood Glucose.: 258 mg/dL (2023 11:07)    CAPILLARY BLOOD GLUCOSE      POCT Blood Glucose.: 226 mg/dL (10 Apr 2023 02:47)    I&O's Summary    Daily     Daily     PHYSICAL EXAMINATION:  GENERAL: NAD, lying in bed comfortably  HEAD:  Normocephalic  EYES: Sclera clear  ENT: Moist mucous membranes  NECK: Supple, No JVD  CHEST/LUNG: Clear to auscultation bilaterally; No rales, rhonchi, wheezing, or rubs. Unlabored respirations  HEART: Regular rate and rhythm; No murmurs, rubs, or gallops  ABDOMEN: BSx4; Soft, nontender, nondistended  EXTREMITIES:  2+ Peripheral Pulses, brisk capillary refill. 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 Strength WNL in all 4 extremities  SKIN: No rashes or lesions    LABS:                          12.5   4.98  )-----------( 258      ( 10 Apr 2023 06:49 )             36.3     04-09    136  |  100  |  10  ----------------------------<  298<H>  4.2   |  23  |  0.77    Ca    9.4      2023 12:14    TPro  7.7  /  Alb  4.1  /  TBili  0.3  /  DBili  x   /  AST  27  /  ALT  26  /  AlkPhos  70  04-09    LIVER FUNCTIONS - ( 2023 12:14 )  Alb: 4.1 g/dL / Pro: 7.7 g/dL / ALK PHOS: 70 U/L / ALT: 26 U/L / AST: 27 U/L / GGT: x           PT/INR - ( 2023 12:14 )   PT: 12.0 sec;   INR: 1.03 ratio         PTT - ( 2023 12:14 )  PTT:27.7 sec        Urinalysis Basic - ( 2023 14:20 )    Color: Light Yellow / Appearance: Clear / S.030 / pH: x  Gluc: x / Ketone: Negative  / Bili: Negative / Urobili: <2 mg/dL   Blood: x / Protein: Trace / Nitrite: Negative   Leuk Esterase: Negative / RBC: x / WBC x   Sq Epi: x / Non Sq Epi: x / Bacteria: x

## 2023-04-10 NOTE — PROGRESS NOTE ADULT - PROBLEM SELECTOR PLAN 3
home: lantus 25U QHS, novolog 6U TID with meals. Patient notes he is frequently found to be hypoglycemic with symptoms (nausea, weakness, lightheadedness).     - will do lantus 15U QHS and 3U TID for now, uptitrate as needed  - ISS home: lantus 25U QHS, novolog 6U TID with meals. Patient notes he is frequently found to be hypoglycemic with symptoms (nausea, weakness, lightheadedness).   - will do lantus 15U QHS and 3U TID for now, uptitrate as needed  - ISS

## 2023-04-10 NOTE — PROGRESS NOTE ADULT - ASSESSMENT
Mr. Hicks is an 89yo M who is legally blind and has a history of diabetes, glaucoma, BPH and HLD who presents to the [facility] ED from [facility] with dizziness and balance difficulties. Evaluated by neurology in ED; imaging not indicative of intracranial pathology. A/f further workup of peripheral causes of dizziness Mr. Hicks is an 87yo M who is legally blind and has a history of diabetes, glaucoma, BPH and HLD who presents to the [facility] ED from [facility] with dizziness and balance difficulties. Evaluated by neurology in ED; imaging not indicative of intracranial pathology. A/f further workup of peripheral causes of dizziness

## 2023-04-10 NOTE — PROGRESS NOTE ADULT - PROBLEM SELECTOR PLAN 1
Evaluated by neurology in ED. Reproducible with head movements. CTA head and neck unremarkable without acute intracranial pathology or circulation defect. 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 Less likely metabolic or infectious i/s/o afebrile, no leukocytosis, CMP WNL.     - as per neuro recs:        - Orthostatic VS      - EKG       - meclizine 25mg BID PRN ( can increase to 50mg Q8 if necessary)     - zofran 8mg Q8H PRN OR reglan 4mg Q8H PRN for n/v     - clonezepam 0.5mg Q8H PRN for 2-3 days      - vestibular rehab referral on dc      - teach pt albert reese     - ENT and neuro f/u as outpatient   - PT consult Evaluated by neurology in ED. Reproducible with head movements. CTA head and neck unremarkable without acute intracranial pathology or circulation defect. 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 Less likely metabolic or infectious i/s/o afebrile, no leukocytosis, CMP WNL.   - MRI head   - as per neuro recs:       - Orthostatic VS      - EKG       - meclizine 25mg BID PRN ( can increase to 50mg Q8 if necessary)     - zofran 8mg Q8H PRN OR reglan 4mg Q8H PRN for n/v     - clonezepam 0.5mg Q8H PRN for 2-3 days      - vestibular rehab referral on dc      - teach pt albert reese     - ENT and neuro f/u as outpatient   - PT consult

## 2023-04-10 NOTE — PATIENT PROFILE ADULT - FALL HARM RISK - HARM RISK INTERVENTIONS
Assistance with ambulation/Assistance OOB with selected safe patient handling equipment/Communicate Risk of Fall with Harm to all staff/Discuss with provider need for PT consult/Monitor gait and stability/Provide patient with walking aids - walker, cane, crutches/Reinforce activity limits and safety measures with patient and family/Sit up slowly, dangle for a short time, stand at bedside before walking/Tailored Fall Risk Interventions/Visual Cue: Yellow wristband and red socks/Bed in lowest position, wheels locked, appropriate side rails in place/Call bell, personal items and telephone in reach/Instruct patient to call for assistance before getting out of bed or chair/Non-slip footwear when patient is out of bed/[place] to call system/Physically safe environment - no spills, clutter or unnecessary equipment/Purposeful Proactive Rounding/Room/bathroom lighting operational, light cord in reach

## 2023-04-11 ENCOUNTER — TRANSCRIPTION ENCOUNTER (OUTPATIENT)
Age: 88
End: 2023-04-11

## 2023-04-11 LAB
A1C WITH ESTIMATED AVERAGE GLUCOSE RESULT: 9.8 % — HIGH (ref 4–5.6)
ANION GAP SERPL CALC-SCNC: 12 MMOL/L — SIGNIFICANT CHANGE UP (ref 7–14)
BUN SERPL-MCNC: 12 MG/DL — SIGNIFICANT CHANGE UP (ref 7–23)
CALCIUM SERPL-MCNC: 9.2 MG/DL — SIGNIFICANT CHANGE UP (ref 8.4–10.5)
CHLORIDE SERPL-SCNC: 101 MMOL/L — SIGNIFICANT CHANGE UP (ref 98–107)
CO2 SERPL-SCNC: 22 MMOL/L — SIGNIFICANT CHANGE UP (ref 22–31)
CREAT SERPL-MCNC: 0.78 MG/DL — SIGNIFICANT CHANGE UP (ref 0.5–1.3)
EGFR: 86 ML/MIN/1.73M2 — SIGNIFICANT CHANGE UP
ESTIMATED AVERAGE GLUCOSE: 235 — SIGNIFICANT CHANGE UP
GLUCOSE BLDC GLUCOMTR-MCNC: 109 MG/DL — HIGH (ref 70–99)
GLUCOSE BLDC GLUCOMTR-MCNC: 154 MG/DL — HIGH (ref 70–99)
GLUCOSE BLDC GLUCOMTR-MCNC: 161 MG/DL — HIGH (ref 70–99)
GLUCOSE BLDC GLUCOMTR-MCNC: 241 MG/DL — HIGH (ref 70–99)
GLUCOSE SERPL-MCNC: 177 MG/DL — HIGH (ref 70–99)
HCT VFR BLD CALC: 36.9 % — LOW (ref 39–50)
HGB BLD-MCNC: 12.3 G/DL — LOW (ref 13–17)
MAGNESIUM SERPL-MCNC: 2.4 MG/DL — SIGNIFICANT CHANGE UP (ref 1.6–2.6)
MCHC RBC-ENTMCNC: 31.4 PG — SIGNIFICANT CHANGE UP (ref 27–34)
MCHC RBC-ENTMCNC: 33.3 GM/DL — SIGNIFICANT CHANGE UP (ref 32–36)
MCV RBC AUTO: 94.1 FL — SIGNIFICANT CHANGE UP (ref 80–100)
NRBC # BLD: 0 /100 WBCS — SIGNIFICANT CHANGE UP (ref 0–0)
NRBC # FLD: 0 K/UL — SIGNIFICANT CHANGE UP (ref 0–0)
PHOSPHATE SERPL-MCNC: 4.4 MG/DL — SIGNIFICANT CHANGE UP (ref 2.5–4.5)
PLATELET # BLD AUTO: 270 K/UL — SIGNIFICANT CHANGE UP (ref 150–400)
POTASSIUM SERPL-MCNC: 4 MMOL/L — SIGNIFICANT CHANGE UP (ref 3.5–5.3)
POTASSIUM SERPL-SCNC: 4 MMOL/L — SIGNIFICANT CHANGE UP (ref 3.5–5.3)
RBC # BLD: 3.92 M/UL — LOW (ref 4.2–5.8)
RBC # FLD: 11.8 % — SIGNIFICANT CHANGE UP (ref 10.3–14.5)
SODIUM SERPL-SCNC: 135 MMOL/L — SIGNIFICANT CHANGE UP (ref 135–145)
WBC # BLD: 5.01 K/UL — SIGNIFICANT CHANGE UP (ref 3.8–10.5)
WBC # FLD AUTO: 5.01 K/UL — SIGNIFICANT CHANGE UP (ref 3.8–10.5)

## 2023-04-11 PROCEDURE — 99232 SBSQ HOSP IP/OBS MODERATE 35: CPT | Mod: GC

## 2023-04-11 PROCEDURE — 70551 MRI BRAIN STEM W/O DYE: CPT | Mod: 26

## 2023-04-11 RX ORDER — ATORVASTATIN CALCIUM 80 MG/1
40 TABLET, FILM COATED ORAL AT BEDTIME
Refills: 0 | Status: DISCONTINUED | OUTPATIENT
Start: 2023-04-11 | End: 2023-04-12

## 2023-04-11 RX ORDER — ASPIRIN/CALCIUM CARB/MAGNESIUM 324 MG
81 TABLET ORAL DAILY
Refills: 0 | Status: DISCONTINUED | OUTPATIENT
Start: 2023-04-11 | End: 2023-04-12

## 2023-04-11 RX ADMIN — Medication 1: at 07:53

## 2023-04-11 RX ADMIN — SENNA PLUS 2 TABLET(S): 8.6 TABLET ORAL at 22:52

## 2023-04-11 RX ADMIN — Medication 30 MILLILITER(S): at 00:55

## 2023-04-11 RX ADMIN — ATORVASTATIN CALCIUM 40 MILLIGRAM(S): 80 TABLET, FILM COATED ORAL at 19:11

## 2023-04-11 RX ADMIN — Medication 1 DROP(S): at 22:50

## 2023-04-11 RX ADMIN — Medication 2: at 12:16

## 2023-04-11 RX ADMIN — Medication 3 UNIT(S): at 12:17

## 2023-04-11 RX ADMIN — Medication 1 DROP(S): at 05:21

## 2023-04-11 RX ADMIN — ENOXAPARIN SODIUM 40 MILLIGRAM(S): 100 INJECTION SUBCUTANEOUS at 05:21

## 2023-04-11 RX ADMIN — DORZOLAMIDE HYDROCHLORIDE 1 DROP(S): 20 SOLUTION/ DROPS OPHTHALMIC at 05:21

## 2023-04-11 RX ADMIN — Medication 81 MILLIGRAM(S): at 19:11

## 2023-04-11 RX ADMIN — LATANOPROST 1 DROP(S): 0.05 SOLUTION/ DROPS OPHTHALMIC; TOPICAL at 22:50

## 2023-04-11 RX ADMIN — AMLODIPINE BESYLATE 5 MILLIGRAM(S): 2.5 TABLET ORAL at 05:21

## 2023-04-11 RX ADMIN — Medication 3 UNIT(S): at 07:53

## 2023-04-11 NOTE — PROGRESS NOTE ADULT - PROBLEM SELECTOR PLAN 3
home: lantus 25U QHS, novolog 6U TID with meals. Patient notes he is frequently found to be hypoglycemic with symptoms (nausea, weakness, lightheadedness).   - will do lantus 15U QHS and 3U TID for now, uptitrate as needed  - ISS

## 2023-04-11 NOTE — CHART NOTE - NSCHARTNOTEFT_GEN_A_CORE
< from: MR Head No Cont (04.11.23 @ 16:04) >  Nonspecific small region of abnormal signal in the left cerebellar   hemisphere may represent a late subacute lacunar infarction. Differential   diagnosis includes a focus of demyelination or other infectious or   inflammatory process. There is no acute intracranial hemorrhage, or acute   infarction.  < end of copied text >    IMPRESSION   Acute vestibular syndrome considering peripheral vs central cause     RECOMMENDATION   [] Start aspirin 81mg  [] Start Atorvastatin 40mg Qhs   [] Order for TTE   [] Would consider extensive cardiac monitoring with ILR, should not hold up discharge       - Tight glucose control (long-term goal HgbA1c < 6%)  - Stroke education and counseling  - Neuro-checks and VS q4h  - normotensive   - Continue oral diet   - aspiration, fall precautions  - STAT CT head non-contrast for change in neuro exam.   - PT/ OT / DVT ppx per primary team     Patient to be seen by stroke neurologist and team in AM

## 2023-04-11 NOTE — DISCHARGE NOTE PROVIDER - NSDCCPTREATMENT_GEN_ALL_CORE_FT
PRINCIPAL PROCEDURE  Procedure: CTA head w/w/o contrast  Findings and Treatment:   < end of copied text >  IMPRESSION:  CTABRAIN: Patent intracranial circulation. Heavily calcified plaque   along the carotid siphons without significant stenosis. Fetal right PCA   origin.  CTA NECK: Patent cervical vasculature. No flow limiting stenosis or   occlusion.  Mild increasedsize of right upper lobe posterior subpleural pulmonary   nodule to 7 mm, measuring 5 mm in 2019. Recommend 6-12 month follow-up CT   chest to ensure stability.< from: CT Angio Neck w/ IV Cont (04.09.23 @ 13:56) >       PRINCIPAL PROCEDURE  Procedure: CTA head w/w/o contrast  Findings and Treatment:   < end of copied text >  IMPRESSION:  CTABRAIN: Patent intracranial circulation. Heavily calcified plaque   along the carotid siphons without significant stenosis. Fetal right PCA   origin.  CTA NECK: Patent cervical vasculature. No flow limiting stenosis or   occlusion.  Mild increasedsize of right upper lobe posterior subpleural pulmonary   nodule to 7 mm, measuring 5 mm in 2019. Recommend 6-12 month follow-up CT   chest to ensure stability.< from: CT Angio Neck w/ IV Cont (04.09.23 @ 13:56) >        SECONDARY PROCEDURE  Procedure: MRI head  Findings and Treatment:   < end of copied text >  FINDINGS:  The ventricles, sulci and cisternal spaces are prominent and consistent   with generalize volume loss. Small region of abnormal FLAIR hyperintense   signal in the left cerebellar hemisphere. No associated mass effect or   restricted diffusion. Left cerebellar punctate chronic microhemorrhage.   There is no evidence of mass, acute intracranial hemorrhage, or acute   infarction. There is no extra axial collection. No midline shift or other   significant mass effect is noted.  There is normal flow-void within major cranial vessels suggestive of   their patency.  There has been prior bilateral cataract surgery.. The paranasal sinuses   are predominantly clear.. There are postoperative changes related to   prior right mastoidectomy. The left mastoid air cells are predominantly   clear.  IMPRESSION:  Nonspecific small region of abnormal signal in the left cerebellar   hemisphere may represent a late subacute lacunar infarction. Differential   diagnosis includes a focus of demyelination or other infectious or   inflammatory process. There is no acute intracranial hemorrhage, or acute   infarction.  < from: MR Head No Cont (04.11.23 @ 16:04) >      Procedure: Transthoracic echo  Findings and Treatment:   < end of copied text >  CONCLUSIONS:  1. Mitral annular calcification, otherwise normal mitral  valve. Minimal mitral regurgitation.  2. Normal left ventricular internal dimensions and wall  thicknesses.  3. Endocardium not well visualized; grossly normal left  ventricular systolic function.  4. Mild diastolic dysfunction (Stage I).  5. The right ventricle is not well visualized; grossly  normal right ventricular systolic function.< from: Transthoracic Echocardiogram (04.12.23 @ 07:59) >

## 2023-04-11 NOTE — PROGRESS NOTE ADULT - ASSESSMENT
Mr. Hicks is an 89yo M who is legally blind and has a history of diabetes, glaucoma, BPH and HLD who presents to the [facility] ED from [facility] with dizziness and balance difficulties. Evaluated by neurology in ED; imaging not indicative of intracranial pathology. A/f further workup of peripheral causes of dizziness Mr. Hicks is an 89yo M who is legally blind and has a history of diabetes, glaucoma, BPH and HLD who presents to the [facility] ED from [facility] with dizziness and balance difficulties. Evaluated by neurology in ED; imaging not indicative of intracranial pathology. A/f further workup of peripheral causes of dizziness.

## 2023-04-11 NOTE — DISCHARGE NOTE PROVIDER - CARE PROVIDER_API CALL
River Meza (MD)  EEGEpilepsy; Neurology; Sleep Medicine  [address] [place] [address]  Phone: [phone]  Fax: [phone]  Follow Up Time: 2 weeks   River Meza)  EEGEpilepsy; Neurology; Sleep Medicine  [address] [place] [address]  Phone: [phone]  Fax: [phone]  Follow Up Time: 2 weeks    Ricki Duque)  Cardiac Electrophysiology; Cardiovascular Disease; Internal Medicine  [address] [place] [address], [address]  [place] [address]  Phone: [phone]  Fax: [phone]  Follow Up Time: 1 week    Salazar Ramirez)  Internal Medicine  [address] [place] [address]  Phone: [phone]  Fax: [phone]  Established Patient  Follow Up Time: 1 week

## 2023-04-11 NOTE — DISCHARGE NOTE PROVIDER - HOSPITAL COURSE
Mr. Hicks is an 89yo M who is legally blind and has a history of diabetes, glaucoma, BPH and HLD who presents to the [facility] ED from [facility] with dizziness and balance difficulties presenting for further workup.    Hospital course:   table with T-afebrile, , /73, SpO2 100% on RA. Orthostatics negative and EKG NSR. Symptoms of dizziness found to be reproducible with head movements. Labs remarkable for mild anemia (Hgb 12.6), and hyperglycemia to . UA bland. CTA head and neck performed and found no defects in intracranial circulation. No events on tele monitoring. Neurology was consulted, recommended meclizine and Epley maneuver. MRI head was ordered showing ...    Patient deemed medically stable on the day of discharge    Major changes:  1. Begin meclizine 25mg q8 as needed  2. Follow up with the neurologist listed - Dr. Meza   3. Reduced insulin dosing to 15U and 3U TID Mr. Hicks is an 89yo M who is legally blind and has a history of diabetes, glaucoma, BPH and HLD who presents to the [facility] ED from [facility] with dizziness and balance difficulties presenting for further workup.    Hospital course:   table with T-afebrile, , /73, SpO2 100% on RA. Orthostatics negative and EKG NSR. Symptoms of dizziness found to be reproducible with head movements. Labs remarkable for mild anemia (Hgb 12.6), and hyperglycemia to . UA bland. CTA head and neck performed and found no defects in intracranial circulation. No events on tele monitoring. Neurology was consulted, recommended meclizine and Epley maneuver. MRI head was ordered showing left cerebellar lesion, concerning for subacute stroke. Per neur    Patient deemed medically stable on the day of discharge    Major changes:  1. Begin meclizine 25mg q8 as needed  2. Follow up with the neurologist listed - Dr. Meza   3. Reduced insulin dosing to 15U and 3U TID Mr. Hicks is an 89yo M who is legally blind and has a history of diabetes, glaucoma, BPH and HLD who presents to the [facility] ED from [facility] with dizziness and balance difficulties presenting for further workup.    Hospital course:   table with T-afebrile, , /73, SpO2 100% on RA. Orthostatics negative and EKG NSR. Symptoms of dizziness found to be reproducible with head movements. Labs remarkable for mild anemia (Hgb 12.6), and hyperglycemia to . UA bland. CTA head and neck performed and found no defects in intracranial circulation. No events on tele monitoring. Neurology was consulted, recommended meclizine and Epley maneuver. MRI head was ordered showing left cerebellar lesion, concerning for subacute stroke. Started patient on aspirin, statin, TTE WNL. Will need Holter monitoring outpatient. Patient was cleared for discharge by neurology.    Patient deemed medically stable on the day of discharge for return to rehab - [facility].      Major changes:  1. Begin meclizine 25mg q8 as needed, aspirin and statin   2. Follow up with the neurologist listed - Dr. Meza   3. Follow up with cardiology for Holter monitor Mr. Hicks is an 87yo M who is legally blind and has a history of diabetes, glaucoma, BPH and HLD who presents to the [facility] ED from [facility] with dizziness and balance difficulties presenting for further workup.    Hospital course:   table with T-afebrile, , /73, SpO2 100% on RA. Orthostatics negative and EKG NSR. Symptoms of dizziness found to be reproducible with head movements. Labs remarkable for mild anemia (Hgb 12.6), and hyperglycemia to . UA bland. CTA head and neck performed and found no defects in intracranial circulation. No events on tele monitoring. Neurology was consulted, recommended meclizine and Epley maneuver. MRI head was ordered showing left cerebellar lesion, concerning for subacute stroke. Started patient on aspirin, statin, TTE WNL. Will need Holter monitoring outpatient. Patient was cleared for discharge by neurology.    Patient deemed medically stable on the day of discharge for return to rehab - [facility].      Major changes:  1. Continue with aspirin and statin for stroke prevention  2. Begin meclizine 25mg q8 as needed  3. Follow up with the neurologist listed - Dr. Meza   4. Follow up with cardiology for Holter monitor Mr. Hicks is an 87yo M who is legally blind and has a history of diabetes, glaucoma, BPH and HLD who presents to the [facility] ED from [facility] with dizziness and balance difficulties presenting for further workup.    Hospital course:   table with T-afebrile, , /73, SpO2 100% on RA. Orthostatics negative and EKG NSR. Symptoms of dizziness found to be reproducible with head movements. Labs remarkable for mild anemia (Hgb 12.6), and hyperglycemia to . UA bland. CTA head and neck performed and found no defects in intracranial circulation. No events on tele monitoring. Neurology was consulted, recommended meclizine and Epley maneuver. MRI head was ordered showing left cerebellar lesion, concerning for subacute stroke. Started patient on aspirin, statin, TTE WNL. Will need Holter monitoring outpatient. Patient was cleared for discharge by neurology.    Patient deemed medically stable on the day of discharge for return to rehab at [facility].      Major changes:  1. Continue with aspirin and statin for stroke prevention  2. Begin meclizine 25mg q8 as needed  3. Follow up with the neurologist listed - Dr. Meza   4. Follow up with cardiology for Holter monitor

## 2023-04-11 NOTE — DISCHARGE NOTE PROVIDER - CARE PROVIDERS DIRECT ADDRESSES
,DirectAddress_Unknown ,DirectAddress_Unknown,[email or web address]

## 2023-04-11 NOTE — PROGRESS NOTE ADULT - PROBLEM SELECTOR PLAN 2
Legally blind, at neurologic baseline currently. Follows with [facility] opt as outpatient.   - Current eyedrop regimen as per most recent optho note (in [facility] [place]): Timolol BID OU, dorzolamide BID OD, Xalatan QHS OU, artificial tears TID  - continue while inpatient

## 2023-04-11 NOTE — DISCHARGE NOTE PROVIDER - NSDCMRMEDTOKEN_GEN_ALL_CORE_FT
amLODIPine 5 mg oral tablet: 1 tab(s) orally once a day  docusate sodium 100 mg oral capsule: 1 cap(s) orally once a day  dorzolamide 2% ophthalmic solution: 1 in each eye 2 times a day  doxazosin 4 mg oral tablet: 1 orally once a day  ferrous sulfate 325 mg (65 mg elemental iron) oral tablet: 1 orally once a day  Lantus 100 units/mL subcutaneous solution: 25 unit(s) subcutaneous once a day (at bedtime)  latanoprost 0.005% ophthalmic solution: 1 drop(s) to each affected eye once a day (at bedtime)  NovoLOG 100 units/mL injectable solution: 6 unit(s) injectable 3 times a day (with meals)  ocular lubricant ophthalmic solution: 1 drop(s) to each affected eye 4 times a day  Proscar 5 mg oral tablet: 1 orally once a day  Sani-Supp adult rectal suppository: 1 rectally once a day as needed for  constipation  senna oral tablet: 2 tab(s) orally once a day (at bedtime)  simvastatin 20 mg oral tablet: 1 tab(s) orally once a day (at bedtime)  timolol maleate 0.5% ophthalmic solution: 1 drop(s) to each affected eye 2 times a day   amLODIPine 5 mg oral tablet: 1 tab(s) orally once a day  aspirin 81 mg oral tablet, chewable: 1 tab(s) orally once a day  docusate sodium 100 mg oral capsule: 1 cap(s) orally once a day  dorzolamide 2% ophthalmic solution: 1 in each eye 2 times a day  doxazosin 4 mg oral tablet: 1 orally once a day  ferrous sulfate 325 mg (65 mg elemental iron) oral tablet: 1 orally once a day  Lantus 100 units/mL subcutaneous solution: 25 unit(s) subcutaneous once a day (at bedtime)  latanoprost 0.005% ophthalmic solution: 1 drop(s) to each affected eye once a day (at bedtime)  meclizine 25 mg oral tablet: 1 tab(s) orally 2 times a day As needed Dizziness  NovoLOG 100 units/mL injectable solution: 6 unit(s) injectable 3 times a day (with meals)  ocular lubricant ophthalmic solution: 1 drop(s) to each affected eye 4 times a day  Proscar 5 mg oral tablet: 1 orally once a day  Sani-Supp adult rectal suppository: 1 rectally once a day as needed for  constipation  senna oral tablet: 2 tab(s) orally once a day (at bedtime)  simvastatin 20 mg oral tablet: 1 tab(s) orally once a day (at bedtime)  timolol maleate 0.5% ophthalmic solution: 1 drop(s) to each affected eye 2 times a day

## 2023-04-11 NOTE — PROGRESS NOTE ADULT - SUBJECTIVE AND OBJECTIVE BOX
PATIENT: VINOD LIZARRAGA, MRN: [id number]    CHIEF COMPLAINT: Patient is a 88y old  Male who presents with a chief complaint of Dizziness (10 Apr 2023 07:36)      INTERVAL HISTORY/OVERNIGHT EVENTS: No overnight events.     MEDICATIONS:  MEDICATIONS  (STANDING):  amLODIPine   Tablet 5 milliGRAM(s) Oral daily  artificial  tears Solution 1 Drop(s) Both EYES three times a day  dextrose 5%. 1000 milliLiter(s) (50 mL/Hr) IV Continuous <Continuous>  dextrose 5%. 1000 milliLiter(s) (100 mL/Hr) IV Continuous <Continuous>  dextrose 50% Injectable 25 Gram(s) IV Push once  dextrose 50% Injectable 12.5 Gram(s) IV Push once  dextrose 50% Injectable 25 Gram(s) IV Push once  dorzolamide 2% Ophthalmic Solution 1 Drop(s) Both EYES <User Schedule>  enoxaparin Injectable 40 milliGRAM(s) SubCutaneous every 24 hours  glucagon  Injectable 1 milliGRAM(s) IntraMuscular once  insulin glargine Injectable (LANTUS) 15 Unit(s) SubCutaneous at bedtime  insulin lispro (ADMELOG) corrective regimen sliding scale   SubCutaneous three times a day before meals  insulin lispro Injectable (ADMELOG) 3 Unit(s) SubCutaneous three times a day with meals  latanoprost 0.005% Ophthalmic Solution 1 Drop(s) Both EYES at bedtime  senna 2 Tablet(s) Oral at bedtime  timolol 0.5% Solution 1 Drop(s) Both EYES two times a day    MEDICATIONS  (PRN):  aluminum hydroxide/magnesium hydroxide/simethicone Suspension 30 milliLiter(s) Oral every 4 hours PRN Dyspepsia  dextrose Oral Gel 15 Gram(s) Oral once PRN Blood Glucose LESS THAN 70 milliGRAM(s)/deciliter  meclizine 25 milliGRAM(s) Oral two times a day PRN Dizziness  melatonin 3 milliGRAM(s) Oral at bedtime PRN Insomnia  ondansetron Injectable 4 milliGRAM(s) IV Push every 8 hours PRN Nausea and/or Vomiting      ALLERGIES: Allergies    Ornex (Other)  Percocet 10/325 (Other)  Valium (Other)    Intolerances        OBJECTIVE:  ICU Vital Signs Last 24 Hrs  T(C): 36.3 (2023 05:20), Max: 36.6 (10 Apr 2023 20:20)  T(F): 97.3 (2023 05:20), Max: 97.8 (10 Apr 2023 20:20)  HR: 70 (2023 05:20) (70 - 76)  BP: 136/67 (2023 05:20) (128/60 - 155/85)  BP(mean): --  ABP: --  ABP(mean): --  RR: 17 (2023 05:20) (17 - 18)  SpO2: 100% (2023 05:20) (98% - 100%)    O2 Parameters below as of 2023 05:20  Patient On (Oxygen Delivery Method): room air            Adult Advanced Hemodynamics Last 24 Hrs  CVP(mm Hg): --  CVP(cm H2O): --  CO: --  CI: --  PA: --  PA(mean): --  PCWP: --  SVR: --  SVRI: --  PVR: --  PVRI: --  CAPILLARY BLOOD GLUCOSE      POCT Blood Glucose.: 164 mg/dL (10 Apr 2023 22:10)  POCT Blood Glucose.: 109 mg/dL (10 Apr 2023 17:48)  POCT Blood Glucose.: 194 mg/dL (10 Apr 2023 12:46)  POCT Blood Glucose.: 194 mg/dL (10 Apr 2023 09:10)    CAPILLARY BLOOD GLUCOSE      POCT Blood Glucose.: 164 mg/dL (10 Apr 2023 22:10)    I&O's Summary    Daily Height in cm: 160.02 (10 Apr 2023 20:20)    Daily     PHYSICAL EXAMINATION:  GENERAL: NAD, lying in bed comfortably  HEAD:  Normocephalic  EYES: Sclera clear  ENT: Moist mucous membranes  NECK: Supple, No JVD  CHEST/LUNG: Clear to auscultation bilaterally; No rales, rhonchi, wheezing, or rubs. Unlabored respirations  HEART: Regular rate and rhythm; No murmurs, rubs, or gallops  ABDOMEN: BSx4; Soft, nontender, nondistended  EXTREMITIES:  2+ Peripheral Pulses, brisk capillary refill. 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 Strength WNL in all 4 extremities  SKIN: No rashes or lesions    LABS:                          12.3   5.01  )-----------( 270      ( 2023 06:10 )             36.9     04-10    136  |  101  |  9   ----------------------------<  195<H>  4.0   |  24  |  0.77    Ca    9.4      10 Apr 2023 06:49    TPro  7.2  /  Alb  3.9  /  TBili  0.3  /  DBili  x   /  AST  20  /  ALT  21  /  AlkPhos  63  04-10    LIVER FUNCTIONS - ( 10 Apr 2023 06:49 )  Alb: 3.9 g/dL / Pro: 7.2 g/dL / ALK PHOS: 63 U/L / ALT: 21 U/L / AST: 20 U/L / GGT: x           PT/INR - ( 2023 12:14 )   PT: 12.0 sec;   INR: 1.03 ratio         PTT - ( 2023 12:14 )  PTT:27.7 sec        Urinalysis Basic - ( 2023 14:20 )    Color: Light Yellow / Appearance: Clear / S.030 / pH: x  Gluc: x / Ketone: Negative  / Bili: Negative / Urobili: <2 mg/dL   Blood: x / Protein: Trace / Nitrite: Negative   Leuk Esterase: Negative / RBC: x / WBC x   Sq Epi: x / Non Sq Epi: x / Bacteria: x       PATIENT: VINOD LIZARRAGA, MRN: [id number]    CHIEF COMPLAINT: Patient is a 88y old  Male who presents with a chief complaint of Dizziness (10 Apr 2023 07:36)      INTERVAL HISTORY/OVERNIGHT EVENTS: No overnight events. Patient reports that he has spinning sensation when he looks to his left only. Has been afraid to get up and move because of fear of falling. He denies palpitations/lightheadedness, SOB, CP.     MEDICATIONS:  MEDICATIONS  (STANDING):  amLODIPine   Tablet 5 milliGRAM(s) Oral daily  artificial  tears Solution 1 Drop(s) Both EYES three times a day  dextrose 5%. 1000 milliLiter(s) (50 mL/Hr) IV Continuous <Continuous>  dextrose 5%. 1000 milliLiter(s) (100 mL/Hr) IV Continuous <Continuous>  dextrose 50% Injectable 25 Gram(s) IV Push once  dextrose 50% Injectable 12.5 Gram(s) IV Push once  dextrose 50% Injectable 25 Gram(s) IV Push once  dorzolamide 2% Ophthalmic Solution 1 Drop(s) Both EYES <User Schedule>  enoxaparin Injectable 40 milliGRAM(s) SubCutaneous every 24 hours  glucagon  Injectable 1 milliGRAM(s) IntraMuscular once  insulin glargine Injectable (LANTUS) 15 Unit(s) SubCutaneous at bedtime  insulin lispro (ADMELOG) corrective regimen sliding scale   SubCutaneous three times a day before meals  insulin lispro Injectable (ADMELOG) 3 Unit(s) SubCutaneous three times a day with meals  latanoprost 0.005% Ophthalmic Solution 1 Drop(s) Both EYES at bedtime  senna 2 Tablet(s) Oral at bedtime  timolol 0.5% Solution 1 Drop(s) Both EYES two times a day    MEDICATIONS  (PRN):  aluminum hydroxide/magnesium hydroxide/simethicone Suspension 30 milliLiter(s) Oral every 4 hours PRN Dyspepsia  dextrose Oral Gel 15 Gram(s) Oral once PRN Blood Glucose LESS THAN 70 milliGRAM(s)/deciliter  meclizine 25 milliGRAM(s) Oral two times a day PRN Dizziness  melatonin 3 milliGRAM(s) Oral at bedtime PRN Insomnia  ondansetron Injectable 4 milliGRAM(s) IV Push every 8 hours PRN Nausea and/or Vomiting      ALLERGIES: Allergies    Ornex (Other)  Percocet 10/325 (Other)  Valium (Other)    Intolerances        OBJECTIVE:  ICU Vital Signs Last 24 Hrs  T(C): 36.3 (2023 05:20), Max: 36.6 (10 Apr 2023 20:20)  T(F): 97.3 (2023 05:20), Max: 97.8 (10 Apr 2023 20:20)  HR: 70 (2023 05:20) (70 - 76)  BP: 136/67 (2023 05:20) (128/60 - 155/85)  BP(mean): --  ABP: --  ABP(mean): --  RR: 17 (2023 05:20) (17 - 18)  SpO2: 100% (2023 05:20) (98% - 100%)    O2 Parameters below as of 2023 05:20  Patient On (Oxygen Delivery Method): room air            Adult Advanced Hemodynamics Last 24 Hrs  CVP(mm Hg): --  CVP(cm H2O): --  CO: --  CI: --  PA: --  PA(mean): --  PCWP: --  SVR: --  SVRI: --  PVR: --  PVRI: --  CAPILLARY BLOOD GLUCOSE      POCT Blood Glucose.: 164 mg/dL (10 Apr 2023 22:10)  POCT Blood Glucose.: 109 mg/dL (10 Apr 2023 17:48)  POCT Blood Glucose.: 194 mg/dL (10 Apr 2023 12:46)  POCT Blood Glucose.: 194 mg/dL (10 Apr 2023 09:10)    CAPILLARY BLOOD GLUCOSE      POCT Blood Glucose.: 164 mg/dL (10 Apr 2023 22:10)    I&O's Summary    Daily Height in cm: 160.02 (10 Apr 2023 20:20)    Daily     PHYSICAL EXAMINATION:  GENERAL: NAD, lying in bed comfortably  HEAD:  Normocephalic  EYES: Sclera clear  ENT: Moist mucous membranes  NECK: Supple, No JVD  CHEST/LUNG: Clear to auscultation bilaterally; No rales, rhonchi, wheezing, or rubs. Unlabored respirations  HEART: Regular rate and rhythm; No murmurs, rubs, or gallops  ABDOMEN: BSx4; Soft, nontender, nondistended  EXTREMITIES:  2+ Peripheral Pulses, brisk capillary refill. 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 Strength WNL in all 4 extremities  SKIN: No rashes or lesions    LABS:                          12.3   5.01  )-----------( 270      ( 2023 06:10 )             36.9     04-10    136  |  101  |  9   ----------------------------<  195<H>  4.0   |  24  |  0.77    Ca    9.4      10 Apr 2023 06:49    TPro  7.2  /  Alb  3.9  /  TBili  0.3  /  DBili  x   /  AST  20  /  ALT  21  /  AlkPhos  63  04-10    LIVER FUNCTIONS - ( 10 Apr 2023 06:49 )  Alb: 3.9 g/dL / Pro: 7.2 g/dL / ALK PHOS: 63 U/L / ALT: 21 U/L / AST: 20 U/L / GGT: x           PT/INR - ( 2023 12:14 )   PT: 12.0 sec;   INR: 1.03 ratio         PTT - ( 2023 12:14 )  PTT:27.7 sec        Urinalysis Basic - ( 2023 14:20 )    Color: Light Yellow / Appearance: Clear / S.030 / pH: x  Gluc: x / Ketone: Negative  / Bili: Negative / Urobili: <2 mg/dL   Blood: x / Protein: Trace / Nitrite: Negative   Leuk Esterase: Negative / RBC: x / WBC x   Sq Epi: x / Non Sq Epi: x / Bacteria: x

## 2023-04-11 NOTE — DISCHARGE NOTE PROVIDER - NSDCCPCAREPLAN_GEN_ALL_CORE_FT
PRINCIPAL DISCHARGE DIAGNOSIS  Diagnosis: Dizziness  Assessment and Plan of Treatment: You were admitted to the hospital for dizziness with change in head motions. Imaging of the head did not show any lesions that would contribute to your presentation and the vessels of your brain are open. You were seen by the neurology team who believe it is benign cause due to loose crystals that are moving in your ear canal. You were started on meclizine as needed and taught the Epley maneuver to decrease the dizziness.   Please follow up with your primary care doctor and the neurologist listed.     PRINCIPAL DISCHARGE DIAGNOSIS  Diagnosis: Dizziness  Assessment and Plan of Treatment: You were admitted to the hospital for dizziness with change in head motions. Imaging of the head did not show any lesions that would contribute to your presentation and the vessels of your brain are open. You were seen by the neurology team. MRI was performed showing lesion in the cerebellum that is concerning for subacute stroke. The best way to manage this is by preventing future strokes. You will need to follow up with neurology as well as cardiology for heart monitoring to assure you have no abnormal rhythm of the heart. Please take aspirin and statin, and continue to control your diabetes to prevent strokes in the future. You can take meclizine as needed for dizziness, please take precautions when walking in the future.   Please follow up with your primary care doctor and the neurologist listed.

## 2023-04-11 NOTE — DISCHARGE NOTE PROVIDER - NSDCFUADDAPPT_GEN_ALL_CORE_FT
APPTS ARE READY TO BE MADE: [x ] YES    Best Family or Patient Contact (if needed):    Additional Information about above appointments (if needed):    1: Dr. Meza - neurology   2:   3:     Other comments or requests:    Please follow up with your primary care doctor listed above. Please follow up with the neurology team for follow up of your stroke. Please follow up with cardiology about placement of Holter monitor to check for abnormal rhythm.   Please take your medications as prescribed.  Please follow up with your primary care doctor listed above. Please follow up with the neurology team for follow up of your stroke. Please follow up with cardiology about placement of Holter monitor to check for abnormal rhythm.   Please take your medications as prescribed.     Patient is being discharged to rehab. Patient/caregiver will arrange follow up care appointments.

## 2023-04-11 NOTE — DISCHARGE NOTE PROVIDER - PROVIDER TOKENS
PROVIDER:[TOKEN:[[id number]],FOLLOWUP:[2 weeks]] PROVIDER:[TOKEN:[[id number]],FOLLOWUP:[2 weeks]],PROVIDER:[TOKEN:[[id number]],FOLLOWUP:[1 week]],PROVIDER:[TOKEN:[[id number]],FOLLOWUP:[1 week],ESTABLISHEDPATIENT:[T]]

## 2023-04-11 NOTE — PROGRESS NOTE ADULT - PROBLEM SELECTOR PLAN 1
Evaluated by neurology in ED. Reproducible with head movements. CTA head and neck unremarkable without acute intracranial pathology or circulation defect. 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 Less likely metabolic or infectious i/s/o afebrile, no leukocytosis, CMP WNL.   - MRI head   - as per neuro recs:       - Orthostatic VS      - EKG       - meclizine 25mg BID PRN ( can increase to 50mg Q8 if necessary)     - zofran 8mg Q8H PRN OR reglan 4mg Q8H PRN for n/v     - clonezepam 0.5mg Q8H PRN for 2-3 days      - vestibular rehab referral on dc      - teach pt albert reese     - ENT and neuro f/u as outpatient   - PT consult Evaluated by neurology in ED. Reproducible with head movements. CTA head and neck unremarkable without acute intracranial pathology or circulation defect. 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 Less likely metabolic or infectious i/s/o afebrile, no leukocytosis, CMP WNL.   PT recommending return to rehab   - as per neuro recs:       - EKG       - meclizine 25mg BID PRN ( can increase to 50mg Q8 if necessary)     - zofran 8mg Q8H PRN OR reglan 4mg Q8H PRN for n/v     - clonezepam 0.5mg Q8H PRN for 2-3 days      - vestibular rehab referral on dc   - pending MRI head for discharge      - ENT and neuro f/u as outpatient

## 2023-04-11 NOTE — DISCHARGE NOTE PROVIDER - NSDCFUSCHEDAPPT_GEN_ALL_CORE_FT
Neftali Rodriguez  [facility]  [address]  Scheduled Appointment: 05/04/2023

## 2023-04-12 ENCOUNTER — TRANSCRIPTION ENCOUNTER (OUTPATIENT)
Age: 88
End: 2023-04-12

## 2023-04-12 VITALS
RESPIRATION RATE: 18 BRPM | TEMPERATURE: 98 F | SYSTOLIC BLOOD PRESSURE: 138 MMHG | DIASTOLIC BLOOD PRESSURE: 79 MMHG | OXYGEN SATURATION: 100 % | HEART RATE: 95 BPM

## 2023-04-12 LAB
ANION GAP SERPL CALC-SCNC: 12 MMOL/L — SIGNIFICANT CHANGE UP (ref 7–14)
BUN SERPL-MCNC: 11 MG/DL — SIGNIFICANT CHANGE UP (ref 7–23)
CALCIUM SERPL-MCNC: 9.5 MG/DL — SIGNIFICANT CHANGE UP (ref 8.4–10.5)
CHLORIDE SERPL-SCNC: 103 MMOL/L — SIGNIFICANT CHANGE UP (ref 98–107)
CO2 SERPL-SCNC: 21 MMOL/L — LOW (ref 22–31)
CREAT SERPL-MCNC: 0.76 MG/DL — SIGNIFICANT CHANGE UP (ref 0.5–1.3)
EGFR: 87 ML/MIN/1.73M2 — SIGNIFICANT CHANGE UP
GLUCOSE BLDC GLUCOMTR-MCNC: 171 MG/DL — HIGH (ref 70–99)
GLUCOSE BLDC GLUCOMTR-MCNC: 171 MG/DL — HIGH (ref 70–99)
GLUCOSE BLDC GLUCOMTR-MCNC: 262 MG/DL — HIGH (ref 70–99)
GLUCOSE BLDC GLUCOMTR-MCNC: 275 MG/DL — HIGH (ref 70–99)
GLUCOSE SERPL-MCNC: 179 MG/DL — HIGH (ref 70–99)
HCT VFR BLD CALC: 38.8 % — LOW (ref 39–50)
HGB BLD-MCNC: 12.9 G/DL — LOW (ref 13–17)
MAGNESIUM SERPL-MCNC: 2.2 MG/DL — SIGNIFICANT CHANGE UP (ref 1.6–2.6)
MCHC RBC-ENTMCNC: 31 PG — SIGNIFICANT CHANGE UP (ref 27–34)
MCHC RBC-ENTMCNC: 33.2 GM/DL — SIGNIFICANT CHANGE UP (ref 32–36)
MCV RBC AUTO: 93.3 FL — SIGNIFICANT CHANGE UP (ref 80–100)
NRBC # BLD: 0 /100 WBCS — SIGNIFICANT CHANGE UP (ref 0–0)
NRBC # FLD: 0 K/UL — SIGNIFICANT CHANGE UP (ref 0–0)
PHOSPHATE SERPL-MCNC: 3.9 MG/DL — SIGNIFICANT CHANGE UP (ref 2.5–4.5)
PLATELET # BLD AUTO: 297 K/UL — SIGNIFICANT CHANGE UP (ref 150–400)
POTASSIUM SERPL-MCNC: 3.9 MMOL/L — SIGNIFICANT CHANGE UP (ref 3.5–5.3)
POTASSIUM SERPL-SCNC: 3.9 MMOL/L — SIGNIFICANT CHANGE UP (ref 3.5–5.3)
RBC # BLD: 4.16 M/UL — LOW (ref 4.2–5.8)
RBC # FLD: 11.6 % — SIGNIFICANT CHANGE UP (ref 10.3–14.5)
SODIUM SERPL-SCNC: 136 MMOL/L — SIGNIFICANT CHANGE UP (ref 135–145)
WBC # BLD: 4.63 K/UL — SIGNIFICANT CHANGE UP (ref 3.8–10.5)
WBC # FLD AUTO: 4.63 K/UL — SIGNIFICANT CHANGE UP (ref 3.8–10.5)

## 2023-04-12 PROCEDURE — 99222 1ST HOSP IP/OBS MODERATE 55: CPT | Mod: GC

## 2023-04-12 PROCEDURE — 99239 HOSP IP/OBS DSCHRG MGMT >30: CPT | Mod: GC

## 2023-04-12 PROCEDURE — 93306 TTE W/DOPPLER COMPLETE: CPT | Mod: 26

## 2023-04-12 RX ORDER — POLYETHYLENE GLYCOL 3350 17 G/17G
17 POWDER, FOR SOLUTION ORAL ONCE
Refills: 0 | Status: COMPLETED | OUTPATIENT
Start: 2023-04-12 | End: 2023-04-12

## 2023-04-12 RX ORDER — ASPIRIN/CALCIUM CARB/MAGNESIUM 324 MG
1 TABLET ORAL
Qty: 0 | Refills: 0 | DISCHARGE
Start: 2023-04-12

## 2023-04-12 RX ORDER — DOXAZOSIN MESYLATE 4 MG
4 TABLET ORAL AT BEDTIME
Refills: 0 | Status: DISCONTINUED | OUTPATIENT
Start: 2023-04-12 | End: 2023-04-12

## 2023-04-12 RX ORDER — MECLIZINE HCL 12.5 MG
1 TABLET ORAL
Qty: 0 | Refills: 0 | DISCHARGE
Start: 2023-04-12

## 2023-04-12 RX ORDER — FINASTERIDE 5 MG/1
5 TABLET, FILM COATED ORAL DAILY
Refills: 0 | Status: DISCONTINUED | OUTPATIENT
Start: 2023-04-12 | End: 2023-04-12

## 2023-04-12 RX ORDER — ASPIRIN/CALCIUM CARB/MAGNESIUM 324 MG
81 TABLET ORAL DAILY
Refills: 0 | Status: DISCONTINUED | OUTPATIENT
Start: 2023-04-12 | End: 2023-04-12

## 2023-04-12 RX ADMIN — Medication 1 DROP(S): at 15:04

## 2023-04-12 RX ADMIN — Medication 3 UNIT(S): at 12:12

## 2023-04-12 RX ADMIN — Medication 81 MILLIGRAM(S): at 15:04

## 2023-04-12 RX ADMIN — Medication 1: at 09:20

## 2023-04-12 RX ADMIN — POLYETHYLENE GLYCOL 3350 17 GRAM(S): 17 POWDER, FOR SOLUTION ORAL at 10:25

## 2023-04-12 RX ADMIN — AMLODIPINE BESYLATE 5 MILLIGRAM(S): 2.5 TABLET ORAL at 05:35

## 2023-04-12 RX ADMIN — Medication 3: at 12:11

## 2023-04-12 RX ADMIN — DORZOLAMIDE HYDROCHLORIDE 1 DROP(S): 20 SOLUTION/ DROPS OPHTHALMIC at 05:36

## 2023-04-12 RX ADMIN — ENOXAPARIN SODIUM 40 MILLIGRAM(S): 100 INJECTION SUBCUTANEOUS at 05:35

## 2023-04-12 RX ADMIN — Medication 1 DROP(S): at 05:36

## 2023-04-12 RX ADMIN — Medication 3 UNIT(S): at 09:21

## 2023-04-12 RX ADMIN — FINASTERIDE 5 MILLIGRAM(S): 5 TABLET, FILM COATED ORAL at 10:25

## 2023-04-12 NOTE — PROGRESS NOTE ADULT - SUBJECTIVE AND OBJECTIVE BOX
INTERVAL HISTORY: Patient seen at bedside by stroke team & attending. No acute events overnight. No acute neurologic complaints, denies clumsiness. Reports constipation and requests a stool softener; relayed to primary team nurses. Discussed MRI findings and management moving forward.     PAST MEDICAL & SURGICAL HISTORY:  Diabetes      Hypertension      HLD (hyperlipidemia)      S/P orthopedic surgery, follow-up exam        FAMILY HISTORY:  No pertinent family history in first degree relatives      SOCIAL HISTORY:   T/E/D:   Occupation:   Lives with:     MEDICATIONS (HOME):  Home Medications:  amLODIPine 5 mg oral tablet: 1 tab(s) orally once a day (09 Apr 2023 23:47)  docusate sodium 100 mg oral capsule: 1 cap(s) orally once a day (03 Jul 2017 09:02)  dorzolamide 2% ophthalmic solution: 1 in each eye 2 times a day (09 Apr 2023 22:50)  doxazosin 4 mg oral tablet: 1 orally once a day (09 Apr 2023 22:22)  ferrous sulfate 325 mg (65 mg elemental iron) oral tablet: 1 orally once a day (09 Apr 2023 22:20)  Lantus 100 units/mL subcutaneous solution: 25 unit(s) subcutaneous once a day (at bedtime) (09 Apr 2023 23:45)  latanoprost 0.005% ophthalmic solution: 1 drop(s) to each affected eye once a day (at bedtime) (03 Jul 2017 09:02)  NovoLOG 100 units/mL injectable solution: 6 unit(s) injectable 3 times a day (with meals) (09 Apr 2023 23:46)  ocular lubricant ophthalmic solution: 1 drop(s) to each affected eye 4 times a day (03 Jul 2017 09:02)  Proscar 5 mg oral tablet: 1 orally once a day (09 Apr 2023 22:20)  Sani-Supp adult rectal suppository: 1 rectally once a day as needed for  constipation (09 Apr 2023 22:23)  senna oral tablet: 2 tab(s) orally once a day (at bedtime) (03 Jul 2017 09:02)  simvastatin 20 mg oral tablet: 1 tab(s) orally once a day (at bedtime) (26 Jun 2017 16:40)  timolol maleate 0.5% ophthalmic solution: 1 drop(s) to each affected eye 2 times a day (03 Jul 2017 09:02)    MEDICATIONS  (STANDING):  amLODIPine   Tablet 5 milliGRAM(s) Oral daily  artificial  tears Solution 1 Drop(s) Both EYES three times a day  aspirin  chewable 81 milliGRAM(s) Oral daily  atorvastatin 40 milliGRAM(s) Oral at bedtime  dextrose 5%. 1000 milliLiter(s) (100 mL/Hr) IV Continuous <Continuous>  dextrose 5%. 1000 milliLiter(s) (50 mL/Hr) IV Continuous <Continuous>  dextrose 50% Injectable 25 Gram(s) IV Push once  dextrose 50% Injectable 12.5 Gram(s) IV Push once  dextrose 50% Injectable 25 Gram(s) IV Push once  dorzolamide 2% Ophthalmic Solution 1 Drop(s) Both EYES <User Schedule>  doxazosin 4 milliGRAM(s) Oral at bedtime  enoxaparin Injectable 40 milliGRAM(s) SubCutaneous every 24 hours  finasteride 5 milliGRAM(s) Oral daily  glucagon  Injectable 1 milliGRAM(s) IntraMuscular once  insulin glargine Injectable (LANTUS) 15 Unit(s) SubCutaneous at bedtime  insulin lispro (ADMELOG) corrective regimen sliding scale   SubCutaneous three times a day before meals  insulin lispro Injectable (ADMELOG) 3 Unit(s) SubCutaneous three times a day with meals  latanoprost 0.005% Ophthalmic Solution 1 Drop(s) Both EYES at bedtime  senna 2 Tablet(s) Oral at bedtime  timolol 0.5% Solution 1 Drop(s) Both EYES two times a day    MEDICATIONS  (PRN):  aluminum hydroxide/magnesium hydroxide/simethicone Suspension 30 milliLiter(s) Oral every 4 hours PRN Dyspepsia  dextrose Oral Gel 15 Gram(s) Oral once PRN Blood Glucose LESS THAN 70 milliGRAM(s)/deciliter  meclizine 25 milliGRAM(s) Oral two times a day PRN Dizziness  melatonin 3 milliGRAM(s) Oral at bedtime PRN Insomnia  ondansetron Injectable 4 milliGRAM(s) IV Push every 8 hours PRN Nausea and/or Vomiting    ALLERGIES/INTOLERANCES:  Allergies  Ornex (Other)  Percocet 10/325 (Other)  Valium (Other)    Intolerances    VITALS & EXAMINATION:  Vital Signs Last 24 Hrs  T(C): 36.8 (12 Apr 2023 11:53), Max: 36.8 (12 Apr 2023 11:53)  T(F): 98.2 (12 Apr 2023 11:53), Max: 98.2 (12 Apr 2023 11:53)  HR: 95 (12 Apr 2023 11:53) (67 - 95)  BP: 138/79 (12 Apr 2023 11:53) (121/68 - 156/60)  BP(mean): --  RR: 18 (12 Apr 2023 11:53) (18 - 18)  SpO2: 100% (12 Apr 2023 11:53) (99% - 100%)    Parameters below as of 12 Apr 2023 11:53  Patient On (Oxygen Delivery Method): room air        General:  Constitutional: Male, appears stated age, in no apparent distress including pain  Head: Normocephalic & Atraumatic.  Respiratory: Breathing comfortably.    Neurological:  MS: Eyes open. Awake, alert, oriented to person, place, situation, time. Follows all commands.    Language: Speech is clear, fluent with good repetition & comprehension.    CNs: Legally blind, visual field testing deferred. EOMI no nystagmus. V1-3 intact to LT b/l. No facial asymmetry b/l, full eye closure strength b/l.     Motor: Normal muscle bulk & tone. No noticeable tremor. UE moving antigravity & symmetric. Both LE observed moving symmetrically in the plane of the bed.     Sensation: Intact to LT b/l throughout.     Cortical: Extinction on DSS (neglect): none    Coordination: No dysmetria to FTN b/l. Slower fine finger movements of L hand.     Gait: Deferred.     LABORATORY:  CBC                       12.9   4.63  )-----------( 297      ( 12 Apr 2023 05:32 )             38.8     Chem 04-12    136  |  103  |  11  ----------------------------<  179<H>  3.9   |  21<L>  |  0.76    Ca    9.5      12 Apr 2023 05:32  Phos  3.9     04-12  Mg     2.20     04-12      LFTs   Coagulopathy   Lipid Panel 04-10 Chol 135 LDL -- HDL 41 Trig 73  A1c   Cardiac enzymes     U/A   CSF  Immunological  Other    STUDIES & IMAGING:  Studies (EKG, EEG, EMG, etc):     Radiology (XR, CT, MR, U/S, TTE/MELODY):    MRI brain w/o contrast:     The ventricles, sulci and cisternal spaces are prominent and consistent   with generalize volume loss. Small region of abnormal FLAIR hyperintense   signal in the left cerebellar hemisphere. No associated mass effect or   restricted diffusion. Left cerebellar punctate chronic microhemorrhage.   There is no evidence of mass, acute intracranial hemorrhage, or acute   infarction. There is no extra axial collection. No midline shift or other   significant mass effect is noted.    There is normal flow-void within major cranial vessels suggestive of   their patency.    There has been prior bilateral cataract surgery.. The paranasal sinuses   are predominantly clear.. There are postoperative changes related to   prior right mastoidectomy. The left mastoid air cells are predominantly   clear.    IMPRESSION:  Nonspecific small region of abnormal signal in the left cerebellar   hemisphere may represent a late subacute lacunar infarction. Differential   diagnosis includes a focus of demyelination or other infectious or   inflammatory process. There is no acute intracranial hemorrhage, or acute   infarction.    TTE:    Ejection Fraction (Modified Mcnair Rule): 64 %  ------------------------------------------------------------------------  OBSERVATIONS:  Mitral Valve: Mitral annular calcification, otherwise  normal mitral valve. Minimal mitral regurgitation.  Aortic Root: Normal aortic root.  Aortic Valve: Aortic valve leaflet morphology not well  visualized.  Left Atrium: Normal left atrium.  Left Ventricle: Endocardium not well visualized; grossly  normal left ventricular systolic function. Normal left  ventricular internal dimensions and wall thicknesses. Mild  diastolic dysfunction (Stage I).  Right Heart: Normal right atrium. The right ventricle is  not well visualized; grossly normal right ventricular  systolic function. Normal tricuspid valve.  Minimal  tricuspid regurgitation. Normal pulmonic valve.  Pericardium/PleuraNormal pericardium with no pericardial  effusion.  ------------------------------------------------------------------------  CONCLUSIONS:  1. Mitral annular calcification, otherwise normal mitral  valve. Minimal mitral regurgitation.  2. Normal left ventricular internal dimensions and wall  thicknesses.  3. Endocardium not well visualized; grossly normal left  ventricular systolic function.  4. Mild diastolic dysfunction (Stage I).  5. The right ventricle is not well visualized; grossly  normal right ventricular systolic function.  ------------------------------------------------------------------------  Confirmed on  4/12/2023 - 09:53:54 by Amadeo Bill M.D.,  [facility], [facility]  ------------------------------------------------------------------------      < from: CT Angio Neck w/ IV Cont (04.09.23 @ 13:56) >  IMPRESSION:    CTABRAIN: Patent intracranial circulation. Heavily calcified plaque   along the carotid siphons without significant stenosis. Fetal right PCA   origin.    CTA NECK: Patent cervical vasculature. No flow limiting stenosis or   occlusion.    Mild increasedsize of right upper lobe posterior subpleural pulmonary   nodule to 7 mm, measuring 5 mm in 2019. Recommend 6-12 month follow-up CT   chest to ensure stability.    --- End of Report ---    < end of copied text >  < from: CT Head No Cont (11.09.19 @ 03:18) >    IMPRESSION:  No acute intracranial hemorrhage or mass effect from vasogenic edema.   Chronic findings as above.    < end of copied text >

## 2023-04-12 NOTE — DISCHARGE NOTE NURSING/CASE MANAGEMENT/SOCIAL WORK - NSDCPEFALRISK_GEN_ALL_CORE
For information on Fall & Injury Prevention, visit: [email or web address] OR  [email or web address] OR  https://www.cdc.gov/steadi/patient.html

## 2023-04-12 NOTE — PROGRESS NOTE ADULT - ASSESSMENT
Mr. Hicks is an 87yo M who is legally blind and has a history of diabetes, glaucoma, BPH and HLD who presents to the [facility] ED from [facility] with dizziness and balance difficulties. Evaluated by neurology in ED; imaging not indicative of intracranial pathology. A/f further workup of peripheral causes of dizziness.

## 2023-04-12 NOTE — PROGRESS NOTE ADULT - ATTENDING COMMENTS
#  Acute Left Cerebellar CVA     88M w/ DM, glaucoma (legally blind) and hypertension who presented with sudden dizziness worse with head movement but no other focal neurologic deficit. MRI brain showed left cerebellar subacute infarct which can explain his symptoms. TTE completed, however limited as without bubble. Maximizing medical therapy as above with plans for outpatient follow up. Has tender LLE lymph node with no peripheral swelling. No indication for LE dopplers at this time.     PT recommending to return back to [place].     Dispo: likely today, d/c planning 45 min coordinating care
88M w/ DM, glaucoma (legally blind) and hypertension who presented with sudden dizziness worse with head movement but no other focal neurologic deficit. Neurology consulted will check MR brain to r/o central etiologies, however peripheral remains higher on the differential.   Orthostatics negative, c/w meclizine prn. PT recommending to return back to [place].     Dispo: possibly tomorrow pending MRI
88M w/ DM, glaucoma (legally blind) and hypertension who presented with sudden dizziness worse with head movement but no other focal neurologic deficit. Neurology consulted will check MR brain to r/o central etiologies, however peripheral remains higher on the differential.   c/w meclizine prn. Will obtain PT consult.   f/u orthostatics

## 2023-04-12 NOTE — CONSULT NOTE ADULT - ASSESSMENT
88M w/ hx of legally blind, DM, HTN, HLD, BPH, presented initially with dizziness and imbalance, found to have L cerebellar lesion on MRI 4/11 concerning for subacute stroke. EP consulted for evaluation for arrhythmogenic CVA.    #L cerebellar subacute stroke  #Dizziness    Recs:  - neurology and stroke teams following, agree with aspirin/statin for now for stroke while pending work-up  - EKG's reviewed showing sinus rhythm w/ APC's  - not on telemetry currently  - TTE as above with grossly normal LVSF, minimal MR, mild diastolic dysfunction, grossly normal RVSF  - pt reporting R thigh "bump" and pain, consider RLE duplex to evaluate for DVT  - if DVT+, recommend TTE w/ bubble study to evaluate for PFO  - pending work-up, will consider ILR prior to discharge to evaluate for occult arrhythmogenic CVA    Recs preliminary until co-signed by attending.    Tejinder Ma MD  Cardiology Fellow - PGY4    Please check [email or web address]: [email or web address] for cardiology service schedule and contact information.

## 2023-04-12 NOTE — PROGRESS NOTE ADULT - PROBLEM SELECTOR PLAN 6
DVT ppx: lovenox   Diet: CC  Dispo:   PT recommending return to rehab
DVT ppx: lovenox   Diet: CC  Dispo: back to [facility] pending medical optimization
DVT ppx: lovenox   Diet: CC  Dispo: back to [facility] pending medical optimization

## 2023-04-12 NOTE — DISCHARGE NOTE NURSING/CASE MANAGEMENT/SOCIAL WORK - PATIENT PORTAL LINK FT
You can access the FollowMyHealth Patient Portal offered by  by registering at the following website: [email or web address]. By joining [employer]’s FollowMyHealth portal, you will also be able to view your health information using other applications (apps) compatible with our system.

## 2023-04-12 NOTE — CONSULT NOTE ADULT - SUBJECTIVE AND OBJECTIVE BOX
Neurology - Consult Note    -  Spectra: [phone] ([facility]), [phone] ([facility])  -    HPI: Patient VINOD LIZARRAGA is a 88y (1935) man with a PMHx significant for Legally blind and diabetic who presented to the ED from [facility]  for dizziness described as feeling unbalanced. This began last night around 10pm but resumed until 9am went away and then worsened. Pt denied any prior similar episodes. Pt stated that his symptoms worsen upon movement. Admits to not drinking a lot of water the past few days. No prior CVA. Symptoms reproducible when patient is trying to turn his head in the ER. Denied HA, numbness, tinnitus.    nihss 0   MRS 4    Review of Systems:    CONSTITUTIONAL: No fevers or chills  EYES AND ENT: No visual changes or no throat pain   NECK: No pain or stiffness  RESPIRATORY: No hemoptysis or shortness of breath  CARDIOVASCULAR: No chest pain or palpitations  GASTROINTESTINAL: No melena or hematochezia  GENITOURINARY: No dysuria or hematuria  NEUROLOGICAL: +As stated in HPI above  SKIN: No itching, burning, rashes, or lesions   All other review of systems is negative unless indicated above.    Allergies:  Ornex (Other)  Percocet 10/325 (Other)  Valium (Other)      PMHx/PSHx/Family Hx: As above, otherwise see below   No pertinent past medical history    Diabetes    Hypertension    HLD (hyperlipidemia)        Social Hx:  No current use of tobacco, alcohol, or illicit drugs    Medications:  MEDICATIONS  (STANDING):    MEDICATIONS  (PRN):        Home Medications:  brimonidine 0.2% ophthalmic solution: 1 drop(s) to each affected eye 2 times a day (03 Jul 2017 09:02)  docusate sodium 100 mg oral capsule: 1 cap(s) orally once a day (03 Jul 2017 09:02)  doxazosin 2 mg oral tablet: 1 tab(s) orally once a day (26 Jun 2017 16:40)  insulin glargine: 10 unit(s) subcutaneous once (at bedtime) (03 Jul 2017 09:02)  insulin lispro 100 units/mL subcutaneous solution:  subcutaneous 3 times a day (before meals) ; 1 Unit(s) if Glucose 151 - 200  2 Unit(s) if Glucose 201 - 250  3 Unit(s) if Glucose 251 - 300  4 Unit(s) if Glucose 301 - 350  5 Unit(s) if Glucose 351 - 400  6 Unit(s) if Glucose GREATER THAN 400 (03 Jul 2017 09:02)  insulin lispro 100 units/mL subcutaneous solution: 3 unit(s) subcutaneous 3 times a day (before meals) (03 Jul 2017 09:02)  latanoprost 0.005% ophthalmic solution: 1 drop(s) to each affected eye once a day (at bedtime) (03 Jul 2017 09:02)  ocular lubricant ophthalmic solution: 1 drop(s) to each affected eye 4 times a day (03 Jul 2017 09:02)  senna oral tablet: 2 tab(s) orally once a day (at bedtime) (03 Jul 2017 09:02)  simvastatin 20 mg oral tablet: 1 tab(s) orally once a day (at bedtime) (26 Jun 2017 16:40)  timolol maleate 0.5% ophthalmic solution: 1 drop(s) to each affected eye 2 times a day (03 Jul 2017 09:02)      Vitals:  T(C): 35.9 (04-09-23 @ 15:40), Max: 36.9 (04-09-23 @ 11:00)  HR: 69 (04-09-23 @ 15:40) (69 - 105)  BP: 132/78 (04-09-23 @ 15:40) (132/78 - 143/78)  RR: 18 (04-09-23 @ 15:40) (17 - 18)  SpO2: 98% (04-09-23 @ 15:40) (98% - 100%)    Physical Examination:   General - NAD  Cardiovascular - Peripheral pulses palpable, no edema    Neurologic Exam:  Mental status - Awake, Alert, Oriented to person, place, and time. Speech fluent, repetition and naming intact. Follows simple and complex commands.     Cranial nerves - PERRL, legally blind but able to see shadows (at his neurological baseline) and count some fingers, EOMI, face sensation (V1-V3) intact LT, No facial asymmetry b/l, hearing grossly intact b/l, trapezius 5/5 strength b/l, tongue midline on protrusion     Motor - Normal bulk and tone throughout. No pronator drift.  Strength testing            Deltoid      Biceps      Triceps     Wrist Extension    Wrist Flexion       R            5                 5               5                     5                              5                        5  L             5                 5               5                     5                              5                       5              Hip Flexion    Hip Extension    Knee Flexion    Knee Extension    Dorsiflexion    Plantar Flexion  R              5                           5                       5                           5                            5                          5  L              5                           5                        5                           5                            5                          5    Sensation - Light touch/temperature intact throughout    DTR's -             Biceps      Triceps     Brachioradialis      Patellar    Ankle    Toes/plantar response  R             2+             2+                  2+                       2+            2+                 Down  L              2+             2+                 2+                        2+           2+                 Down    Coordination - Finger to Nose intact b/l. No tremors appreciated    Gait and station - differed     Labs:                        12.6   6.52  )-----------( 293      ( 09 Apr 2023 12:14 )             37.3     04-09    136  |  100  |  10  ----------------------------<  298<H>  4.2   |  23  |  0.77    Ca    9.4      09 Apr 2023 12:14    TPro  7.7  /  Alb  4.1  /  TBili  0.3  /  DBili  x   /  AST  27  /  ALT  26  /  AlkPhos  70  04-09    CAPILLARY BLOOD GLUCOSE      POCT Blood Glucose.: 258 mg/dL (09 Apr 2023 11:07)    LIVER FUNCTIONS - ( 09 Apr 2023 12:14 )  Alb: 4.1 g/dL / Pro: 7.7 g/dL / ALK PHOS: 70 U/L / ALT: 26 U/L / AST: 27 U/L / GGT: x             PT/INR - ( 09 Apr 2023 12:14 )   PT: 12.0 sec;   INR: 1.03 ratio         PTT - ( 09 Apr 2023 12:14 )  PTT:27.7 sec    Radiology:    < from: CT Angio Neck w/ IV Cont (04.09.23 @ 13:56) >  IMPRESSION:    CTABRAIN: Patent intracranial circulation. Heavily calcified plaque   along the carotid siphons without significant stenosis. Fetal right PCA   origin.    CTA NECK: Patent cervical vasculature. No flow limiting stenosis or   occlusion.    Mild increasedsize of right upper lobe posterior subpleural pulmonary   nodule to 7 mm, measuring 5 mm in 2019. Recommend 6-12 month follow-up CT   chest to ensure stability.    --- End of Report ---    < end of copied text >  < from: CT Head No Cont (11.09.19 @ 03:18) >    IMPRESSION:  No acute intracranial hemorrhage or mass effect from vasogenic edema.   Chronic findings as above.    < end of copied text >  
Electrophysiology Consult Note   [Please check [email or web address] password: [email or web address]" for cardiology service schedule and contact information]    HPI:  88M w/ hx of legally blind, DM, HTN, HLD, BPH, presented initially with dizziness and imbalance, found to have L cerebellar lesion on MRI 4/11 concerning for subacute stroke. EP consulted for evaluation for arrhythmogenic CVA.    On interview with pt today stating dizziness and imbalance are improving compared to admission. Denies chest pain, palpitations, syncope, sob, cough, abd pain, n/v, LE swelling. States he does feel a "bump" in R posterior thigh with associated pain, is concerned for possible blood clot there.      PAST MEDICAL & SURGICAL HISTORY:  Diabetes      Hypertension      HLD (hyperlipidemia)      S/P orthopedic surgery, follow-up exam        FAMILY HISTORY:  No pertinent family history in first degree relatives      SOCIAL HISTORY:  unchanged    MEDICATIONS:  amLODIPine   Tablet 5 milliGRAM(s) Oral daily  aspirin  chewable 81 milliGRAM(s) Oral daily  doxazosin 4 milliGRAM(s) Oral at bedtime  enoxaparin Injectable 40 milliGRAM(s) SubCutaneous every 24 hours        meclizine 25 milliGRAM(s) Oral two times a day PRN  melatonin 3 milliGRAM(s) Oral at bedtime PRN  ondansetron Injectable 4 milliGRAM(s) IV Push every 8 hours PRN    aluminum hydroxide/magnesium hydroxide/simethicone Suspension 30 milliLiter(s) Oral every 4 hours PRN  senna 2 Tablet(s) Oral at bedtime    atorvastatin 40 milliGRAM(s) Oral at bedtime  dextrose 50% Injectable 12.5 Gram(s) IV Push once  dextrose 50% Injectable 25 Gram(s) IV Push once  dextrose 50% Injectable 25 Gram(s) IV Push once  dextrose Oral Gel 15 Gram(s) Oral once PRN  finasteride 5 milliGRAM(s) Oral daily  glucagon  Injectable 1 milliGRAM(s) IntraMuscular once  insulin glargine Injectable (LANTUS) 15 Unit(s) SubCutaneous at bedtime  insulin lispro (ADMELOG) corrective regimen sliding scale   SubCutaneous three times a day before meals  insulin lispro Injectable (ADMELOG) 3 Unit(s) SubCutaneous three times a day with meals    artificial  tears Solution 1 Drop(s) Both EYES three times a day  dextrose 5%. 1000 milliLiter(s) IV Continuous <Continuous>  dextrose 5%. 1000 milliLiter(s) IV Continuous <Continuous>  dorzolamide 2% Ophthalmic Solution 1 Drop(s) Both EYES <User Schedule>  latanoprost 0.005% Ophthalmic Solution 1 Drop(s) Both EYES at bedtime  timolol 0.5% Solution 1 Drop(s) Both EYES two times a day      REVIEW OF SYSTEMS:  CV: chest pain (-), palpitation (-), orthopnea (-), PND (-), edema (-)  PULM: SOB (-), cough (-), wheezing (-), hemoptysis (-).   CONST: fever (-), chills (-) or fatigue (-)  GI: abdominal distension (-), abdominal pain (-) , nausea/vomiting (-), hematemesis, (-), melena (-), hematochezia (-)  : dysuria (-), frequency (-), hematuria (-).   NEURO: numbness (-), weakness (-), dizziness (+)  SKIN: itching (-), rash (-)  HEENT:  visual changes (-); vertigo or throat pain (-);  neck stiffness (-)     All other review of systems is negative unless indicated above.   -------------------------------------------------------------------------------------------  PHYSICAL EXAM:  T(C): 36.7 (04-12-23 @ 05:31), Max: 36.7 (04-12-23 @ 05:31)  HR: 71 (04-12-23 @ 05:31) (67 - 74)  BP: 121/68 (04-12-23 @ 05:31) (121/68 - 156/60)  RR: 18 (04-12-23 @ 05:31) (18 - 18)  SpO2: 99% (04-12-23 @ 05:31) (99% - 100%)  Wt(kg): --  I&O's Summary    11 Apr 2023 07:01  -  12 Apr 2023 07:00  --------------------------------------------------------  IN: 0 mL / OUT: 550 mL / NET: -550 mL        GENERAL: NAD  HEAD: Atraumatic, Normocephalic.  EYES: EOMI, PERRLA, conjunctiva and sclera clear.  ENT: Moist mucous membranes.  NECK: Supple, No JVD.  CHEST/LUNG: Clear to auscultation bilaterally; No rales, rhonchi, wheezing, or rubs. Unlabored respirations.  HEART: Regular rate and rhythm; No murmurs, rubs, or gallops.  ABDOMEN: Bowel sounds present; Soft, Nontender, Nondistended.   EXTREMITIES:  2+ Peripheral Pulses, brisk capillary refill. No clubbing, cyanosis, or edema.    -------------------------------------------------------------------------------------------  LABS:                          12.9   4.63  )-----------( 297      ( 12 Apr 2023 05:32 )             38.8     04-12    136  |  103  |  11  ----------------------------<  179<H>  3.9   |  21<L>  |  0.76    Ca    9.5      12 Apr 2023 05:32  Phos  3.9     04-12  Mg     2.20     04-12        CARDIAC MARKERS ( 09 Apr 2023 12:14 )  7 ng/L / x     / x     / x     / x     / x              meclizine 25 milliGRAM(s) Oral two times a day PRN  melatonin 3 milliGRAM(s) Oral at bedtime PRN  ondansetron Injectable 4 milliGRAM(s) IV Push every 8 hours PRN      -------------------------------------------------------------------------------------------  EKG's  4/9/23 12pm: NSR, normal axis, normal intervals, no MARYAN or STD  4/10/23 12:30am: sinus rhythm w/ APC, normal axis, normal intervals, no MARYAN or STD    TTE 4/12/23:  Ejection Fraction (Modified Mcnair Rule): 64 %  ------------------------------------------------------------------------  OBSERVATIONS:  Mitral Valve: Mitral annular calcification, otherwise  normal mitral valve. Minimal mitral regurgitation.  Aortic Root: Normal aortic root.  Aortic Valve: Aortic valve leaflet morphology not well  visualized.  Left Atrium: Normal left atrium.  Left Ventricle: Endocardium not well visualized; grossly  normal left ventricular systolic function. Normal left  ventricular internal dimensions and wall thicknesses. Mild  diastolic dysfunction (Stage I).  Right Heart: Normal right atrium. The right ventricle is  not well visualized; grossly normal right ventricular  systolic function. Normal tricuspid valve.  Minimal  tricuspid regurgitation. Normal pulmonic valve.  Pericardium/PleuraNormal pericardium with no pericardial  effusion.  ------------------------------------------------------------------------  CONCLUSIONS:  1. Mitral annular calcification, otherwise normal mitral  valve. Minimal mitral regurgitation.  2. Normal left ventricular internal dimensions and wall  thicknesses.  3. Endocardium not well visualized; grossly normal left  ventricular systolic function.  4. Mild diastolic dysfunction (Stage I).  5. The right ventricle is not well visualized; grossly  normal right ventricular systolic function.    -------------------------------------------------------------------------------------------

## 2023-04-12 NOTE — PROGRESS NOTE ADULT - PROBLEM SELECTOR PLAN 4
home: simvastatin 20mg QD  - atorvastatin while inpatient
home: simvastatin 20mg QD  - continue with statin
home: simvastatin 20mg QD  - atorvastatin while inpatient

## 2023-04-12 NOTE — PROGRESS NOTE ADULT - PROBLEM SELECTOR PLAN 2
Legally blind, at neurologic baseline currently. Follows with [facility] opt as outpatient.   - Current eyedrop regimen as per most recent optho note (in [facility]): Timolol BID OU, dorzolamide BID OD, Xalatan QHS OU, artificial tears TID  - continue while inpatient

## 2023-04-12 NOTE — PROGRESS NOTE ADULT - PROBLEM SELECTOR PLAN 1
#left cerebellar lesion   Evaluated by neurology in ED. Reproducible with head movements. CTA head and neck unremarkable without acute intracranial pathology or circulation defect. As per neuro, most likely acute vestibular syndrome2/2 peripheral   MRI 4/11 with left cerebellar lesion concerning for subacute stroke   - started ASA, statin  - TTE ordered   - as per neuro recs:       - EKG       - meclizine 25mg BID PRN ( can increase to 50mg Q8 if necessary)     - zofran 8mg Q8H PRN OR reglan 4mg Q8H PRN for n/v     - vestibular rehab referral on dc        - ENT and neuro f/u as outpatient

## 2023-04-12 NOTE — PROGRESS NOTE ADULT - ASSESSMENT
88y (1935) man with a PMHx significant for Legally blind and diabetic who presented to the ED from [facility]  for dizziness described as feeling unbalanced. This began last night around 10pm but resumed until 9am went away and then worsened. Pt denied any prior similar episodes. Pt stated that his symptoms worsen upon movement. Admits to not drinking a lot of water the past few days. No prior CVA. Symptoms reproducible when patient is trying to turn his head in the ER. Denied HA, numbness, tinnitus.  CTH/CTA as above. MRI brain w/o contrast shows possible L cerebellar subacute infarct. TTE unremarkable. Exam as above.     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     Recommendations    [] Extended cardiac monitoring; after discussion with attending, can be done outpatient with Holter Monitoring for 2-4 weeks.  [] C/w ASA 81 mg indefinitely  [] Atorvastatin 40 mg QHS  [] Chemical DVT prophylaxis  [] Neurochecks q8hr   [] BP goals: Normotension, avoid hypotension if possible.   [] PT/OT: Returning to previous facility  [] Diet: c/w DASH diet as tolerated  [] HgA1C goals < 7.0   [] Lifestyle modifications: smoking cessation, exercise, and a Mediterranean style diet.     From neurovascular standpoint, no further inpatient workup required. Cleared for discharge.    Patient case discussed and seen with stroke attending, Dr. Alexandra.    Please call with questions: [phone]

## 2023-04-12 NOTE — PROGRESS NOTE ADULT - SUBJECTIVE AND OBJECTIVE BOX
PATIENT: VINOD LIZARRAGA, MRN: [id number]    CHIEF COMPLAINT: Patient is a 88y old  Male who presents with a chief complaint of Dizziness (11 Apr 2023 15:58)      INTERVAL HISTORY/OVERNIGHT EVENTS: Found to have lesion in cerebellum, neurology made aware. ASA, statin ordered. Pt still reports dizziness on head turning but denies chest pain, headaches, SOB.     MEDICATIONS:  MEDICATIONS  (STANDING):  amLODIPine   Tablet 5 milliGRAM(s) Oral daily  artificial  tears Solution 1 Drop(s) Both EYES three times a day  aspirin  chewable 81 milliGRAM(s) Oral daily  atorvastatin 40 milliGRAM(s) Oral at bedtime  dextrose 5%. 1000 milliLiter(s) (50 mL/Hr) IV Continuous <Continuous>  dextrose 5%. 1000 milliLiter(s) (100 mL/Hr) IV Continuous <Continuous>  dextrose 50% Injectable 25 Gram(s) IV Push once  dextrose 50% Injectable 12.5 Gram(s) IV Push once  dextrose 50% Injectable 25 Gram(s) IV Push once  dorzolamide 2% Ophthalmic Solution 1 Drop(s) Both EYES <User Schedule>  enoxaparin Injectable 40 milliGRAM(s) SubCutaneous every 24 hours  glucagon  Injectable 1 milliGRAM(s) IntraMuscular once  insulin glargine Injectable (LANTUS) 15 Unit(s) SubCutaneous at bedtime  insulin lispro (ADMELOG) corrective regimen sliding scale   SubCutaneous three times a day before meals  insulin lispro Injectable (ADMELOG) 3 Unit(s) SubCutaneous three times a day with meals  latanoprost 0.005% Ophthalmic Solution 1 Drop(s) Both EYES at bedtime  senna 2 Tablet(s) Oral at bedtime  timolol 0.5% Solution 1 Drop(s) Both EYES two times a day    MEDICATIONS  (PRN):  aluminum hydroxide/magnesium hydroxide/simethicone Suspension 30 milliLiter(s) Oral every 4 hours PRN Dyspepsia  dextrose Oral Gel 15 Gram(s) Oral once PRN Blood Glucose LESS THAN 70 milliGRAM(s)/deciliter  meclizine 25 milliGRAM(s) Oral two times a day PRN Dizziness  melatonin 3 milliGRAM(s) Oral at bedtime PRN Insomnia  ondansetron Injectable 4 milliGRAM(s) IV Push every 8 hours PRN Nausea and/or Vomiting      ALLERGIES: Allergies    Ornex (Other)  Percocet 10/325 (Other)  Valium (Other)    Intolerances        OBJECTIVE:  ICU Vital Signs Last 24 Hrs  T(C): 36.7 (12 Apr 2023 05:31), Max: 36.7 (12 Apr 2023 05:31)  T(F): 98.1 (12 Apr 2023 05:31), Max: 98.1 (12 Apr 2023 05:31)  HR: 71 (12 Apr 2023 05:31) (67 - 77)  BP: 121/- (12 Apr 2023 05:31) (121/- - 156/60)  BP(mean): --  ABP: --  ABP(mean): --  RR: 18 (12 Apr 2023 05:31) (18 - 18)  SpO2: 99% (12 Apr 2023 05:31) (99% - 100%)    O2 Parameters below as of 12 Apr 2023 05:31  Patient On (Oxygen Delivery Method): room air            Adult Advanced Hemodynamics Last 24 Hrs  CVP(mm Hg): --  CVP(cm H2O): --  CO: --  CI: --  PA: --  PA(mean): --  PCWP: --  SVR: --  SVRI: --  PVR: --  PVRI: --  CAPILLARY BLOOD GLUCOSE      POCT Blood Glucose.: 154 mg/dL (11 Apr 2023 22:38)  POCT Blood Glucose.: 109 mg/dL (11 Apr 2023 17:05)  POCT Blood Glucose.: 241 mg/dL (11 Apr 2023 11:34)  POCT Blood Glucose.: 161 mg/dL (11 Apr 2023 07:28)    CAPILLARY BLOOD GLUCOSE      POCT Blood Glucose.: 154 mg/dL (11 Apr 2023 22:38)    I&O's Summary    11 Apr 2023 07:01  -  12 Apr 2023 06:59  --------------------------------------------------------  IN: 0 mL / OUT: 550 mL / NET: -550 mL      Daily     Daily     PHYSICAL EXAMINATION:  GENERAL: NAD, lying in bed comfortably  HEAD:  Normocephalic  EYES: Sclera clear  ENT: Moist mucous membranes  NECK: Supple, No JVD  CHEST/LUNG: Clear to auscultation bilaterally; No rales, rhonchi, wheezing, or rubs. Unlabored respirations  HEART: Regular rate and rhythm; No murmurs, rubs, or gallops  ABDOMEN: BSx4; Soft, nontender, nondistended  EXTREMITIES:  2+ Peripheral Pulses, brisk capillary refill. 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 Strength WNL in all 4 extremities  SKIN: No rashes or lesions    LABS:                          12.9   4.63  )-----------( 297      ( 12 Apr 2023 05:32 )             38.8     04-11    135  |  101  |  12  ----------------------------<  177<H>  4.0   |  22  |  0.78    Ca    9.2      11 Apr 2023 06:10  Phos  4.4     04-11  Mg     2.40     04-11

## 2023-04-12 NOTE — DISCHARGE NOTE NURSING/CASE MANAGEMENT/SOCIAL WORK - NSDCFUADDAPPT_GEN_ALL_CORE_FT
Please follow up with your primary care doctor listed above. Please follow up with the neurology team for follow up of your stroke. Please follow up with cardiology about placement of Holter monitor to check for abnormal rhythm.   Please take your medications as prescribed.

## 2023-05-04 ENCOUNTER — NON-APPOINTMENT (OUTPATIENT)
Age: 88
End: 2023-05-04

## 2023-05-04 ENCOUNTER — APPOINTMENT (OUTPATIENT)
Dept: OPHTHALMOLOGY | Facility: CLINIC | Age: 88
End: 2023-05-04
Payer: MEDICARE

## 2023-05-04 PROCEDURE — 92012 INTRM OPH EXAM EST PATIENT: CPT

## 2023-07-03 NOTE — ED ADULT NURSE NOTE - PT NEEDS ASSIST
BPIC HISTORY & PHYSICAL:  Date: 7/3/2023    Chief Complaint   Patient presents with   • Chest Pain       History Of Present Illness  Sylvester is a 71 year old male, a patient of Pcp [facility], Unknown, with no past medical history who presented to ER for increase sob for last few days, also patient relates palpitations, dyspnea on exertion and exertional chest pain over the past week.  He is in town visiting from [facility] where he lives primarily.  He states he has no significant medical conditions him and his wife have been traveling via  on the road.  He has noticed some lower extremity leg edema but its not new. He relates being sleeping in the reclined for past few months and even longer. Patient denies cough, fever or chills, denies dysuria or hematochezia. No previous history of venous thromboembolism    Upon presentation to the ED, the patient was afebrile with /113, , RR 24, and SpO2 WNL.   Labs were remarkable for glucose 137, creatinine 1.20, AST 47, , Alk phos 124, albumin 3.4, globulin 4.4. ProBNP was 2256 with troponin and procalc wnl. D-dimer was 2.62. EKG showed atrial fibrillation with a rapid ventricular response rate of 186, no STEMI. CTA Chest showed no definite CT evidence of pulmonary embolism, moderate bilateral pleural effusions, ovoid soft tissue density within the posterior aspect of the upper thoracic spinal canal at approximately T3-T4, inseparable from the posterior thecal sac and measuring approximately 2 cm craniocaudal, reflux of contrast into the intrahepatic inferior vena cava and hepatic veins, raising the possibility of right heart dysfunction, and 6 mm nodule within the left lung apex and less than 3 mm nodule within the left upper lobe. XR Chest showed cardiac silhouette is enlarged, consultations aortic arch, small bilateral pleural effusions and associated interstitial opacities and groundglass opacities throughout both lungs. Congestive heart  failure/edema is suspected. While in ED he was given Lasix, Lopressor, and digoxin.    Subsequently, Sylvester was admitted for further treatment and care.      Past Medical History  No past medical history on file.     Surgical History  No past surgical history on file.     Social History      Family History  No family history on file.     Allergies  ALLERGIES:  Patient has no known allergies.    Medications  (Not in a hospital admission)      Review of Systems  Review of Systems   Constitutional: Negative.    HENT: Negative.    Respiratory: Positive for shortness of breath.    Cardiovascular: Positive for chest pain, palpitations and leg swelling.   Gastrointestinal: Negative.    Genitourinary: Negative.    Musculoskeletal: Negative.    Neurological: Negative.    Hematological: Negative.    Psychiatric/Behavioral: Negative.          OBJECTIVE:    VITAL SIGNS:    Vital Last Value 24 Hour Range   Temperature 97.9 °F (36.6 °C) (07/03/23 0926) Temp  Min: 97.9 °F (36.6 °C)  Max: 97.9 °F (36.6 °C)   Pulse (!) 168 (07/03/23 1300) Pulse  Min: 126  Max: 198   Respiratory 17 (07/03/23 1114) Resp  Min: 17  Max: 24   Non-Invasive  Blood Pressure 122/66 (07/03/23 1230) BP  Min: 118/67  Max: 179/113   Pulse Oximetry 94 % (07/03/23 1300) SpO2  Min: 91 %  Max: 96 %     Vital Today Admitted   Weight 113.4 kg (250 lb) (07/03/23 0929) Weight: 113.4 kg (250 lb) (07/03/23 0929)   Height N/A Height: 5' 9\" (175.3 cm) (07/03/23 0929)   BMI N/A BMI (Calculated): 36.92 (07/03/23 0929)       INTAKE/OUTPUT:    No intake or output data in the 24 hours ending 07/03/23 1328      PHYSICAL EXAM:    Physical Exam  Vitals and nursing note reviewed.   Constitutional:       General: He is not in acute distress.     Appearance: He is well-developed. He is ill-appearing.   HENT:      Head: Normocephalic.      Mouth/Throat:      Mouth: Mucous membranes are moist.   Eyes:      Pupils: Pupils are equal, round, and reactive to light.   Cardiovascular:       Rate and Rhythm: Tachycardia present. Rhythm irregular.   Pulmonary:      Effort: Pulmonary effort is normal.      Comments: Scattered crackles bilaterally   Abdominal:      General: Bowel sounds are normal. There is no distension.      Palpations: Abdomen is soft.      Tenderness: There is no abdominal tenderness.   Musculoskeletal:      Cervical back: Neck supple.      Right lower leg: Edema present.      Left lower leg: Edema present.   Skin:     General: Skin is warm and dry.      Capillary Refill: Capillary refill takes less than 2 seconds.   Neurological:      General: No focal deficit present.      Mental Status: He is alert and oriented to person, place, and time.   Psychiatric:         Mood and Affect: Mood normal.          LABORATORY DATA:    Recent Labs   Lab 07/03/23  0922   WBC 8.7   HCT 48.9   HGB 16.2      SODIUM 141   POTASSIUM 4.0   CHLORIDE 104   CO2 24   CALCIUM 9.4   GLUCOSE 137*   BUN 17   CREATININE 1.20*   AST 47*   *   ALKPT 124*   BILIRUBIN 0.7   ALBUMIN 3.4*   LIPA 230        IMAGING STUDIES:    US VASC EXTREMITY LOWER VENOUS DUPLEX    Result Date: 7/3/2023  Venous ultrasound. CLINICAL HISTORY: Shortness of breath.  Chest pain. COMPARISON: No previous. FINDINGS: Ultrasound images of the right and left lower extremities were obtained.  The right and left common femoral veins, profunda femoral veins, superficial femoral veins and popliteal veins are compressible, with color flow and no evidence of thrombosis.  The visualized right and left posterior tibial and peroneal veins are compressible, with color flow and no evidence of thrombosis.     1.  No evidence of lower extremity deep vein thrombosis. Electronically Signed by: KOSTAS MERCEDES M.D. Signed on: 7/3/2023 1:04 PM Workstation ID: [id number]    CTA CHEST PULMONARY EMBOLISM    Result Date: 7/3/2023  CTA chest for pulmonary embolism with contrast. CLINICAL HISTORY: Positive d-dimer.  Chest pain.  Shortness of  breath. COMPARISON: No prior chest CT. TECHNIQUE: Axial images through the thorax, following 80 mL Omnipaque 350. Sagittal and coronal reconstructed images.  Three-D reformatted MIP images.  Automated exposure control utilized. FINDINGS: No definite filling defects are seen within the pulmonary arteries bilaterally to suggest pulmonary embolism.  Slightly decreased contrast opacification of the right lower lobe pulmonary artery likely represents flow-related artifact. There is atherosclerotic calcification of the thoracic aorta and great vessels.  Aortic valve calcification is noted.  Coronary artery calcification is noted.  There is no evidence of a pericardial effusion. Small lymph nodes within the mediastinum and bilateral hilar regions measure less than 1 cm in short axis.  No enlarged mediastinal or hilar lymph nodes are seen.  No enlarged axillary lymph nodes are seen. There are moderate bilateral pleural effusions, with overlying atelectasis/consolidation, right slightly greater than left.  Bandlike atelectasis is noted within the right upper lobe inferiorly.  Linear atelectasis or scar is noted within the lingula.  There is no evidence of a pneumothorax. Calcified granulomata are noted within the lung fields bilaterally.  There is a 6 mm nodule within the left lung apex on image 35, series 2.  A less than 3 mm nodule is noted within the left upper lobe laterally on image 125, series 2.  There is reflux of contrast into the intrahepatic inferior vena cava and hepatic veins, raising the possibility of right heart dysfunction.  The visualized upper abdomen is otherwise unremarkable. Degenerative changes are present within the spine.  There is ovoid soft tissue density within the posterior aspect of the spinal canal at approximately the T3-T4 level, measuring approximately 2 cm in craniocaudal dimension and inseparable from the posterior thecal sac.  Further imaging with pre and post infused MRI suggested.   Degenerative change is noted at the shoulder joints.  Small ossific densities adjacent to the right humeral head may represent small loose bodies.  Sclerotic foci within the left 12th rib posteriorly and right 6th rib laterally may represent bone islands.      1.  No definite CT evidence of pulmonary embolism. 2.  Moderate bilateral pleural effusions, with overlying atelectasis/consolidation, right slightly greater than left. 3.  Ovoid soft tissue density within the posterior aspect of the upper thoracic spinal canal at approximately T3-T4, inseparable from the posterior thecal sac and measuring approximately 2 cm craniocaudal. Further imaging with pre and post infused MRI suggested. 4.  Reflux of contrast into the intrahepatic inferior vena cava and hepatic veins, raising the possibility of right heart dysfunction. 5.  6 mm nodule within the left lung apex and less than 3 mm nodule within the left upper lobe.  Recommend follow-up chest CT in six months. Additional calcified granulomata are noted within the lung fields bilaterally. Findings discussed by telephone with Dr. Oswaldo Gautam on 7/3/23 at 11:22 a.m. Electronically Signed by: KOSTAS MERCEDES M.D. Signed on: 7/3/2023 11:32 AM Workstation ID: [id number]    XR CHEST PA AND LATERAL 2 VIEWS    Result Date: 7/3/2023  EXAM: Chest, 2 views CLINICAL INDICATION: Pain. COMPARISON: None     Cardiac silhouette is enlarged.  Consultations aortic arch. Small bilateral pleural effusions and associated interstitial opacities and groundglass opacities throughout both lungs.  Congestive heart failure/edema is suspected.  Degenerative changes of the spine. Electronically Signed by: DREAD OROZCO D.O. Signed on: 7/3/2023 9:53 AM Workstation ID: [id number]        ASSESSMENT/PLAN:    Chest pain, dyspnea on exertion, Orthopnea, and palpitation mostly dt New Onset Atrial Fibrillation with RVR   - HR in 80s-150s  - EKG shows Atrial fibrillation with rapid ventricular  response with premature ventricular or aberrantly conducted complexes. ST And T wave abnormality, consider inferior ischemia   - Troponin WNL  - S/P 500 mcg digoxin and 2x 5 mg metoprolol IV in ED  - Start Metoprolol PO and Eliquis 5 mg BID   - Start Cardizem gtt   - Transfer to ICU  - Cardiology and Intensivist following     Elevated Pro BNP possible dt newly diagnosed acute systolic CHF   - Echo shows Left ventricle: The cavity size is moderately dilated. Wall thickness is     mildly increased. Systolic function is normal. The ejection fraction was     measured by biplane method of disks. The ejection fraction is 45%  - start Metoprolol and spironolactone   - Cardiology following     Elevated d dimer possible reactive to above  - Ddimer 2.6  - CTA chest shows   1.  No definite CT evidence of pulmonary embolism.  2.  Moderate bilateral pleural effusions, with overlying  atelectasis/consolidation, right slightly greater than left.  3.  Ovoid soft tissue density within the posterior aspect of the upper thoracic spinal canal at approximately T3-T4, inseparable from the posterior thecal sac and measuring approximately 2 cm craniocaudal. Further imaging with pre and post infused MRI suggested.  4.  Reflux of contrast into the intrahepatic inferior vena cava and hepatic veins, raising the possibility of right heart dysfunction.  5.  6 mm nodule within the left lung apex and less than 3 mm nodule within the left upper lobe.  Recommend follow-up chest CT in six months.   Additional calcified granulomata are noted within the lung fields bilaterally.  - discussed with patient possible need for MRI Lumbar spine in 1-2 days if he can lay flat   - US venows lower extremities no DVT    Mild Elevated LFTs  - Monitor     Elevated creatinine dt CKD II  - baseline unknown  - Monitor     Obesity( BMI 36.92) - Advise dietary/lifestyle modifications    Code: Full Resuscitation     DVT PPX: SCDs and Eliquis    DISPOSITION:  Pending  clinical improvement in above and recommendations from consulting physicians.    ABAD:TBD    PCP:  [facility], Unknown       Charting performed by aroldo To for Dr. Carroll Vines MD     All medical record entries made by the aroldo were at my direction. I have reviewed the chart and agree that the record accurately reflects my personal performance of the history, physical exam, hospital course, and assessment and plan.       no

## 2023-07-15 NOTE — ED PROVIDER NOTE - NS ED MD DISPO ISOLATION TYPES
Patient seen today for follow-up visit    Patient continues to struggle a great deal with anxiety and depression but denies it    Continues to be extremely anxious overwhelmed and feels that she is not herself    Continues to decline ECT recommendations    On Paxil 30 mg and Risperdal 1 mg at this point    Paxil was increased to 30 mg as few days ago    We will increase the dose of Risperdal to 1.5 mg in the evening starting today    Continue with supportive therapy    Continue with reality orientation    Continue with cognitive behavioral therapy    Continue with medication evaluation and stabilization   None

## 2023-08-11 NOTE — DISCHARGE NOTE ADULT - CAREGIVER RELATION TO PATIENT
[facility] contacted patient's son Theresa Flores to offer enrollment in care coordination for his father. He states he lives an hour away and is not as familiar with his father's medications, appointments, etc as his brother Shannon Kolb is. Shannon Kolb lives locally. Theresa Flores suggests AC contact Shannon Kolb and has him present when ACM speaks with his father about care coordination. John Pride has difficulty hearing on the phone at times. Attempted to reach family Shannon Kolb) by telephone. Unable to leave message. Will attempt to reach family again. friend

## 2023-09-07 ENCOUNTER — APPOINTMENT (OUTPATIENT)
Dept: OPHTHALMOLOGY | Facility: CLINIC | Age: 88
End: 2023-09-07

## 2024-03-18 NOTE — ED PROVIDER NOTE - CPE EDP GASTRO NORM
"  SUBJECTIVE:  Subjective  Desmond Cervantes is a 4 m.o. male who is here with parents for Well Child    HPI  Current concerns include none.    Nutrition:  Current diet:formula  Difficulties with feeding? No    Elimination:  Stool consistency and frequency: Normal    Sleep:no problems    Social Screening:  Current  arrangements: home with family    Caregiver concerns regarding:  Hearing? no  Vision? no   Motor skills? no  Behavior/Activity? no    Developmental Screening:        3/18/2024    10:00 AM 3/18/2024     9:37 AM 1/16/2024     9:04 AM 1/16/2024     9:00 AM   SWYC Milestones (4-month)   Holds head steady when being pulled up to a sitting position very much   very much   Brings hands together very much   very much   Laughs somewhat   somewhat   Keeps head steady when held in a sitting position somewhat   very much   Makes sounds like "ga," "ma," or "ba"  somewhat   not yet   Looks when you call his or her name not yet   not yet   Rolls over  not yet      Passes a toy from one hand to the other not yet      Looks for you or another caregiver when upset very much      Holds two objects and bangs them together not yet      (Patient-Entered) Total Development Score - 4 months  9 Incomplete    (Needs Review if <14)    SWYC Developmental Milestones Result: Needs Review- score is below the normal threshold for age on date of screening.      Review of Systems  A comprehensive review of symptoms was completed and negative except as noted above.     OBJECTIVE:  Vital sign  Vitals:    03/18/24 1022   Temp: 97.1 °F (36.2 °C)   Weight: 6.32 kg (13 lb 14.9 oz)   Height: 2' 0.02" (0.61 m)   HC: 43 cm (16.93")       Physical Exam  Vitals and nursing note reviewed.   Constitutional:       General: He is active.      Appearance: Normal appearance. He is well-developed.   HENT:      Head: Normocephalic and atraumatic. Anterior fontanelle is flat.      Right Ear: Ear canal and external ear normal.      Left Ear: Ear " canal and external ear normal.      Nose: Nose normal.      Mouth/Throat:      Mouth: Mucous membranes are moist.      Pharynx: Oropharynx is clear.   Eyes:      General: Red reflex is present bilaterally.      Extraocular Movements: Extraocular movements intact.      Conjunctiva/sclera: Conjunctivae normal.      Pupils: Pupils are equal, round, and reactive to light.   Cardiovascular:      Rate and Rhythm: Normal rate and regular rhythm.      Heart sounds: Normal heart sounds. No murmur heard.     No friction rub. No gallop.   Pulmonary:      Effort: Pulmonary effort is normal.      Breath sounds: Normal breath sounds.   Abdominal:      General: Bowel sounds are normal.      Palpations: Abdomen is soft. There is no mass.      Hernia: No hernia (umbilical hernia resolved) is present.   Genitourinary:     Penis: Uncircumcised.       Testes: Normal.      Rectum: Normal.   Musculoskeletal:         General: Normal range of motion.      Cervical back: Normal range of motion and neck supple.   Skin:     General: Skin is warm.      Capillary Refill: Capillary refill takes less than 2 seconds.      Turgor: Normal.      Findings: No rash. There is no diaper rash.   Neurological:      General: No focal deficit present.      Mental Status: He is alert.      Primitive Reflexes: Suck normal.          ASSESSMENT/PLAN:  Desmond was seen today for well child.    Diagnoses and all orders for this visit:    Encounter for well child check without abnormal findings    Need for vaccination  -     DTaP HepB IPV combined vaccine IM (PEDIARIX)  -     HiB PRP-T conjugate vaccine 4 dose IM  -     Pneumococcal Conjugate Vaccine (20 Valent) (IM)(Preferred)  -     Rotavirus vaccine pentavalent 3 dose oral    Encounter for screening for global developmental delays (milestones)  -     SWYC-Developmental Test         Preventive Health Issues Addressed:  1. Anticipatory guidance discussed and a handout covering well-child issues for age was  provided.    2. Growth and development were reviewed/discussed and are within acceptable ranges for age.    - has rolled over twice and can do it with assistance, already in OT  - has great head control on abdomen and sitting up, discussed purejoel  3. Immunizations and screening tests today: per orders.        Follow Up:  Follow up in about 2 months (around 5/18/2024).             normal...

## 2024-06-13 ENCOUNTER — APPOINTMENT (OUTPATIENT)
Dept: ENDOCRINOLOGY | Facility: CLINIC | Age: 89
End: 2024-06-13
Payer: MEDICARE

## 2024-06-13 VITALS
OXYGEN SATURATION: 98 % | SYSTOLIC BLOOD PRESSURE: 134 MMHG | TEMPERATURE: 96 F | BODY MASS INDEX: 24.2 KG/M2 | DIASTOLIC BLOOD PRESSURE: 70 MMHG | WEIGHT: 141 LBS | HEART RATE: 91 BPM

## 2024-06-13 DIAGNOSIS — E78.5 HYPERLIPIDEMIA, UNSPECIFIED: ICD-10-CM

## 2024-06-13 DIAGNOSIS — I10 ESSENTIAL (PRIMARY) HYPERTENSION: ICD-10-CM

## 2024-06-13 DIAGNOSIS — E11.9 TYPE 2 DIABETES MELLITUS W/OUT COMPLICATIONS: ICD-10-CM

## 2024-06-13 DIAGNOSIS — E11.3293 TYPE 2 DIABETES MELLITUS WITH MILD NONPROLIFERATIVE DIABETIC RETINOPATHY WITHOUT MACULAR EDEMA, BILATERAL: ICD-10-CM

## 2024-06-13 PROCEDURE — 82962 GLUCOSE BLOOD TEST: CPT

## 2024-06-13 PROCEDURE — 36415 COLL VENOUS BLD VENIPUNCTURE: CPT

## 2024-06-13 PROCEDURE — 99204 OFFICE O/P NEW MOD 45 MIN: CPT

## 2024-06-13 PROCEDURE — G2211 COMPLEX E/M VISIT ADD ON: CPT

## 2024-06-13 PROCEDURE — 83036 HEMOGLOBIN GLYCOSYLATED A1C: CPT | Mod: QW

## 2024-06-13 PROCEDURE — 99214 OFFICE O/P EST MOD 30 MIN: CPT

## 2024-06-14 ENCOUNTER — APPOINTMENT (OUTPATIENT)
Dept: ENDOCRINOLOGY | Facility: CLINIC | Age: 89
End: 2024-06-14

## 2024-06-14 LAB
25(OH)D3 SERPL-MCNC: 34.8 NG/ML
ALBUMIN SERPL ELPH-MCNC: 4.3 G/DL
ALP BLD-CCNC: 74 U/L
ALT SERPL-CCNC: 14 U/L
ANION GAP SERPL CALC-SCNC: 14 MMOL/L
AST SERPL-CCNC: 13 U/L
BILIRUB SERPL-MCNC: 0.2 MG/DL
BUN SERPL-MCNC: 12 MG/DL
CALCIUM SERPL-MCNC: 9.8 MG/DL
CHLORIDE SERPL-SCNC: 98 MMOL/L
CHOLEST SERPL-MCNC: 120 MG/DL
CO2 SERPL-SCNC: 24 MMOL/L
CREAT SERPL-MCNC: 0.81 MG/DL
CREAT SPEC-SCNC: 158 MG/DL
EGFR: 85 ML/MIN/1.73M2
ESTIMATED AVERAGE GLUCOSE: 223 MG/DL
FOLATE SERPL-MCNC: >20 NG/ML
FRUCTOSAMINE SERPL-MCNC: 438 UMOL/L
GLUCOSE BLDC GLUCOMTR-MCNC: 239
GLUCOSE SERPL-MCNC: 183 MG/DL
GLYCOMARK.: 3.5 UG/ML
HBA1C MFR BLD HPLC: 9
HBA1C MFR BLD HPLC: 9.4 %
HDLC SERPL-MCNC: 47 MG/DL
LDLC SERPL CALC-MCNC: 56 MG/DL
LDLC SERPL DIRECT ASSAY-MCNC: 57 MG/DL
MAGNESIUM SERPL-MCNC: 2.3 MG/DL
MICROALBUMIN 24H UR DL<=1MG/L-MCNC: 14.5 MG/DL
MICROALBUMIN/CREAT 24H UR-RTO: 92 MG/G
NONHDLC SERPL-MCNC: 73 MG/DL
POTASSIUM SERPL-SCNC: 4.3 MMOL/L
PROT SERPL-MCNC: 7.4 G/DL
SODIUM SERPL-SCNC: 135 MMOL/L
T3FREE SERPL-MCNC: 2.51 PG/ML
T4 FREE SERPL-MCNC: 1.2 NG/DL
TRIGL SERPL-MCNC: 87 MG/DL
TSH SERPL-ACNC: 1.33 UIU/ML
VIT B12 SERPL-MCNC: 925 PG/ML

## 2024-06-23 NOTE — HISTORY OF PRESENT ILLNESS
[FreeTextEntry1] : Mr. VINOD LIZARRAGA is a 89 year old male who presents for initial endocrine evaluation with regard to a hx of type 2 dm. The diabetes has been present for about   30 years. He denies any history of retinopathy or nephropathy. With regard to neuropathy, he reports pain down legs.      He is in a wheel chair as he has vision difficulties and does not have stable gait   He does have hx of mild non proliferative retinopathy He reports blurry vision, following with optho. he reports vision is worsening over last 6 months after having laser surgery. He has loss of vision in right eye.   He resides [facility] for [facility] past 6 months. For the diabetes, he   is currently taking  Januvia 100 mg, metformin 1000 mg BID , Humalog 10 units before meals , tresiba 30 units HS    He   denies polyuria, polydipsia, or any visual changes. He  too denies any skin lesions, skin breakdown or non-healing areas of skin. He  too denies any podiatric concerns. Ophthalmologic evaluation is up to date.   Home glucose monitoring is done by nurse however no logs were brought in. He  does deny any hypoglycemia or hypoglycemic signs or symptoms.  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

## 2024-09-09 NOTE — ED PROVIDER NOTE - CPE EDP [facility] NORM
Continue Regimen: Clobetasol cream  twice a day to few remaining areas of scale Detail Level: Zone Render In Strict Bullet Format?: No normal...

## 2025-05-19 NOTE — ED ADULT NURSE NOTE - HISTORY OF COVID-19 VACCINATION
Verbal order with read back Dr. CARLTON Duff  Resume coumadin 6 mg daily   Recheck in 2 week   Vaccine status unknown

## 2025-05-20 NOTE — ED ADULT NURSE NOTE - EXTENSIONS OF SELF_ADULT
Care Transitions Program Weekly Follow-up Call    Current Issues/Problems reviewed on call: Call with pt was very brief. Pt has been doing okay. Pt is to have IV iron infusions. Writer did follow up with pt to see if she was able to call [facility] Care path to see if she qualifies for their program and for the nurse to come out for injection teaching.  Pt states she did call and was in need of a form and for her doctor to send in her insurance information.  Writer did question this and said to pt that the program typically asks for pt to provide them her insurance information in order to determine if pt qualifies. Pt states she called the GI doctor office and told them what they need.  Writer did not find any notes regarding this.  Writer did find a note regarding pt approval and prescription issues for the Stelara.  Pt became upset and hung up on writer.          Review of Systems:   Care Transition System Evaluation:      Next Care Transitions follow-up call will be within 1 week.    Plan for next follow-up call: follow up.   None